# Patient Record
Sex: OTHER/UNKNOWN | Race: WHITE | HISPANIC OR LATINO | Employment: PART TIME | URBAN - METROPOLITAN AREA
[De-identification: names, ages, dates, MRNs, and addresses within clinical notes are randomized per-mention and may not be internally consistent; named-entity substitution may affect disease eponyms.]

---

## 2023-01-03 ENCOUNTER — TRANSFERRED RECORDS (OUTPATIENT)
Dept: HEALTH INFORMATION MANAGEMENT | Facility: CLINIC | Age: 18
End: 2023-01-03

## 2023-01-04 ENCOUNTER — HOSPITAL ENCOUNTER (OUTPATIENT)
Facility: CLINIC | Age: 18
Setting detail: OBSERVATION
LOS: 1 days | Discharge: HOME | End: 2023-01-07
Attending: EMERGENCY MEDICINE | Admitting: INTERNAL MEDICINE
Payer: COMMERCIAL

## 2023-01-04 ENCOUNTER — ANCILLARY PROCEDURE (OUTPATIENT)
Dept: ULTRASOUND IMAGING | Facility: CLINIC | Age: 18
End: 2023-01-04
Attending: NURSE PRACTITIONER
Payer: COMMERCIAL

## 2023-01-04 DIAGNOSIS — R10.84 GENERALIZED ABDOMINAL PAIN: ICD-10-CM

## 2023-01-04 DIAGNOSIS — Z90.49 STATUS POST LAPAROSCOPIC CHOLECYSTECTOMY: Primary | ICD-10-CM

## 2023-01-04 DIAGNOSIS — K80.50 CHOLEDOCHOLITHIASIS: ICD-10-CM

## 2023-01-04 DIAGNOSIS — Z20.822 LAB TEST NEGATIVE FOR COVID-19 VIRUS: ICD-10-CM

## 2023-01-04 LAB
ALBUMIN SERPL BCG-MCNC: 4.7 G/DL (ref 3.5–5.2)
ALBUMIN UR-MCNC: 50 MG/DL
ALP SERPL-CCNC: 121 U/L (ref 40–129)
ALT SERPL W P-5'-P-CCNC: 199 U/L (ref 10–50)
ANION GAP SERPL CALCULATED.3IONS-SCNC: 13 MMOL/L (ref 7–15)
APPEARANCE UR: CLEAR
AST SERPL W P-5'-P-CCNC: 66 U/L (ref 10–50)
BASOPHILS # BLD AUTO: 0 10E3/UL (ref 0–0.2)
BASOPHILS NFR BLD AUTO: 1 %
BILIRUB SERPL-MCNC: 0.8 MG/DL
BILIRUB UR QL STRIP: NEGATIVE
BUN SERPL-MCNC: 10.6 MG/DL (ref 6–20)
CALCIUM SERPL-MCNC: 9.3 MG/DL (ref 8.6–10)
CHLORIDE SERPL-SCNC: 102 MMOL/L (ref 98–107)
COLOR UR AUTO: YELLOW
CREAT SERPL-MCNC: 0.72 MG/DL (ref 0.67–1.17)
DEPRECATED HCO3 PLAS-SCNC: 24 MMOL/L (ref 22–29)
EOSINOPHIL # BLD AUTO: 0.1 10E3/UL (ref 0–0.7)
EOSINOPHIL NFR BLD AUTO: 1 %
ERYTHROCYTE [DISTWIDTH] IN BLOOD BY AUTOMATED COUNT: 11.9 % (ref 10–15)
GFR SERPL CREATININE-BSD FRML MDRD: >90 ML/MIN/1.73M2
GLUCOSE SERPL-MCNC: 97 MG/DL (ref 70–99)
GLUCOSE UR STRIP-MCNC: NEGATIVE MG/DL
HCT VFR BLD AUTO: 43.1 % (ref 40–53)
HGB BLD-MCNC: 14.2 G/DL (ref 13.3–17.7)
HGB UR QL STRIP: ABNORMAL
IMM GRANULOCYTES # BLD: 0 10E3/UL
IMM GRANULOCYTES NFR BLD: 0 %
KETONES UR STRIP-MCNC: NEGATIVE MG/DL
LEUKOCYTE ESTERASE UR QL STRIP: NEGATIVE
LIPASE SERPL-CCNC: 30 U/L (ref 13–60)
LYMPHOCYTES # BLD AUTO: 2.3 10E3/UL (ref 0.8–5.3)
LYMPHOCYTES NFR BLD AUTO: 29 %
MCH RBC QN AUTO: 30.2 PG (ref 26.5–33)
MCHC RBC AUTO-ENTMCNC: 32.9 G/DL (ref 31.5–36.5)
MCV RBC AUTO: 92 FL (ref 78–100)
MONOCYTES # BLD AUTO: 0.5 10E3/UL (ref 0–1.3)
MONOCYTES NFR BLD AUTO: 6 %
MUCOUS THREADS #/AREA URNS LPF: PRESENT /LPF
NEUTROPHILS # BLD AUTO: 5.2 10E3/UL (ref 1.6–8.3)
NEUTROPHILS NFR BLD AUTO: 63 %
NITRATE UR QL: NEGATIVE
NRBC # BLD AUTO: 0 10E3/UL
NRBC BLD AUTO-RTO: 0 /100
PH UR STRIP: 5.5 [PH] (ref 5–7)
PLATELET # BLD AUTO: 327 10E3/UL (ref 150–450)
POTASSIUM SERPL-SCNC: 3.3 MMOL/L (ref 3.4–5.3)
PROT SERPL-MCNC: 7.7 G/DL (ref 6.4–8.3)
RADIOLOGIST FLAGS: ABNORMAL
RBC # BLD AUTO: 4.7 10E6/UL (ref 4.4–5.9)
RBC URINE: 54 /HPF
SARS-COV-2 RNA RESP QL NAA+PROBE: NEGATIVE
SODIUM SERPL-SCNC: 139 MMOL/L (ref 136–145)
SP GR UR STRIP: 1.03 (ref 1–1.03)
SQUAMOUS EPITHELIAL: 2 /HPF
TRANSITIONAL EPI: <1 /HPF
UROBILINOGEN UR STRIP-MCNC: 2 MG/DL
WBC # BLD AUTO: 8.1 10E3/UL (ref 4–11)
WBC URINE: 1 /HPF

## 2023-01-04 PROCEDURE — 99285 EMERGENCY DEPT VISIT HI MDM: CPT | Performed by: EMERGENCY MEDICINE

## 2023-01-04 PROCEDURE — 85014 HEMATOCRIT: CPT | Performed by: EMERGENCY MEDICINE

## 2023-01-04 PROCEDURE — 83690 ASSAY OF LIPASE: CPT | Performed by: EMERGENCY MEDICINE

## 2023-01-04 PROCEDURE — 76700 US EXAM ABDOM COMPLETE: CPT | Mod: GC | Performed by: STUDENT IN AN ORGANIZED HEALTH CARE EDUCATION/TRAINING PROGRAM

## 2023-01-04 PROCEDURE — 36415 COLL VENOUS BLD VENIPUNCTURE: CPT | Performed by: EMERGENCY MEDICINE

## 2023-01-04 PROCEDURE — 99223 1ST HOSP IP/OBS HIGH 75: CPT | Mod: AI | Performed by: STUDENT IN AN ORGANIZED HEALTH CARE EDUCATION/TRAINING PROGRAM

## 2023-01-04 PROCEDURE — 120N000002 HC R&B MED SURG/OB UMMC

## 2023-01-04 PROCEDURE — U0005 INFEC AGEN DETEC AMPLI PROBE: HCPCS | Performed by: EMERGENCY MEDICINE

## 2023-01-04 PROCEDURE — 250N000013 HC RX MED GY IP 250 OP 250 PS 637

## 2023-01-04 PROCEDURE — 84155 ASSAY OF PROTEIN SERUM: CPT | Performed by: EMERGENCY MEDICINE

## 2023-01-04 PROCEDURE — C9803 HOPD COVID-19 SPEC COLLECT: HCPCS | Performed by: EMERGENCY MEDICINE

## 2023-01-04 PROCEDURE — 99285 EMERGENCY DEPT VISIT HI MDM: CPT | Mod: 25 | Performed by: EMERGENCY MEDICINE

## 2023-01-04 PROCEDURE — 84450 TRANSFERASE (AST) (SGOT): CPT | Performed by: EMERGENCY MEDICINE

## 2023-01-04 PROCEDURE — 81001 URINALYSIS AUTO W/SCOPE: CPT | Performed by: EMERGENCY MEDICINE

## 2023-01-04 RX ORDER — LIDOCAINE 40 MG/G
CREAM TOPICAL
Status: DISCONTINUED | OUTPATIENT
Start: 2023-01-04 | End: 2023-01-07 | Stop reason: HOSPADM

## 2023-01-04 RX ORDER — POTASSIUM CHLORIDE 750 MG/1
40 TABLET, EXTENDED RELEASE ORAL ONCE
Status: COMPLETED | OUTPATIENT
Start: 2023-01-04 | End: 2023-01-04

## 2023-01-04 RX ADMIN — POTASSIUM CHLORIDE 40 MEQ: 750 TABLET, EXTENDED RELEASE ORAL at 19:33

## 2023-01-04 ASSESSMENT — ACTIVITIES OF DAILY LIVING (ADL)
ADLS_ACUITY_SCORE: 35
ADLS_ACUITY_SCORE: 33
ADLS_ACUITY_SCORE: 35
ADLS_ACUITY_SCORE: 35

## 2023-01-04 NOTE — LETTER
[facility] [address]  [address] [facility] [address]  [phone]          January 7, 2023    RE:  Duyen Jones                                                                                                                                                       [address]  [facility] [address]            To whom it may concern:    Duyen Joens was under my professional care during a hospital stay from 1/4-1/7/23.    She  may return to work with the following: The employee is UNABLE to return to work until 1/10/23.    When the patient returns to work, the following restrictions apply until 2/3/23:  No lifting greater than 10-20 pounds. NO rigorous physical activity.      Sincerely,        Aggie Henao MD

## 2023-01-04 NOTE — ED PROVIDER NOTES
[place] EMERGENCY DEPARTMENT ([facility])    1/04/23       ED PROVIDER NOTE   Vertical Triage A 3:22 PM    History     Chief Complaint   Patient presents with     Abdominal Pain     The history is provided by the patient and medical records.     Duyen Jones is a 17 year old transfemale (assigned male at birth, uses she/her pronouns) on hormone replacement therapy who presents with abdominal pain, darker urine and abnormal imaging studies. Symptoms started a few days before New Year's Tonia while at work. She developed some abdominal pain after eating a questionable burrito, thought maybe she had some food poisoning.  This abdominal pain went away without issue but then few days later she developed recurrence of abdominal pain.  This time her abdominal pain was severe to the point where patient had to call in sick for work. Patient also noticed brownish to reddish urine despite hydrating copiously. She went to [facility], had labwork done showing abnormal LFTs.  An attempt was made to obtain these results, unfortunately [facility] is closed today.  She had an abdominal x-ray showing copious amounts of stool and gas.  Due to the abnormal bilirubin, patient had an abdominal ultrasound today concerning for choledocholithiasis. Patient was notified of these results and told to come to the Emergency Department for evaluation.  Currently patient's abdominal pain is a 2/10 in left lower quadrant; no right sided abdominal pain.  Patient endorses increased thirst and darker urine despite hydrating copiously.  Patient has been eating and drinking ok, last ate at 1pm today.  Patient states that she is scheduled for work at 6am tomorrow, asks if she'll need to call in, and if so asks for work note. No fever or chills. Takes medications for HRT, is about to start escitalopram.    EXAMINATION: US ABDOMEN COMPLETE,  1/4/2023 1:38 PM    Gallbladder: Mobile, echogenic stones within  "the gallbladder.  Gallbladder wall thickness is within normal limits measuring 2.7 mm.  No pericholecystic fluid. Negative sonographic Ruelas's sign.     Bile Ducts: Mild intrahepatic biliary ductal dilation.  The common  bile duct measures 8 mm in diameter. There is an echogenic stone  identified in the common bile duct measuring 7.5 x 3 mm (last cine  series, image 103).                                                                    IMPRESSION: Common bile duct is dilated with mild intrahepatic biliary  ductal dilatation, possible calculus in the proximal common bile duct;  cholelithiasis without additional sonographic findings for acute  Cholecystitis.    Past Medical History  No past medical history on file.  No past surgical history on file.  No current outpatient medications on file.    No Known Allergies  Family History  No family history on file.  Social History          A medically appropriate review of systems was performed with pertinent positives and negatives noted in the HPI, and all other systems negative.    Physical Exam   BP: (!) 149/78  Pulse: 109  Temp: 98.6  F (37  C)  Resp: 16  Height: 177.8 cm (5' 10\")  Weight: 79.4 kg (175 lb)  SpO2: 99 %  Physical Exam  Vitals and nursing note reviewed.   Constitutional:       General: He is not in acute distress.     Appearance: Normal appearance. He is not ill-appearing or toxic-appearing.   HENT:      Head: Normocephalic and atraumatic.      Nose: Nose normal.      Mouth/Throat:      Mouth: Mucous membranes are moist.   Eyes:      Pupils: Pupils are equal, round, and reactive to light.   Cardiovascular:      Rate and Rhythm: Normal rate.      Pulses: Normal pulses.      Heart sounds: Normal heart sounds.   Pulmonary:      Effort: Pulmonary effort is normal. No respiratory distress.      Breath sounds: Normal breath sounds.   Abdominal:      General: Abdomen is flat. There is no distension.      Comments: Mild tenderness palpation in the midepigastric " area and bilateral upper quadrants.  No guarding.  Ruelas sign negative.  Abdomen is soft.   Musculoskeletal:         General: No swelling or deformity. Normal range of motion.      Cervical back: Normal range of motion. No rigidity.   Skin:     General: Skin is warm.      Capillary Refill: Capillary refill takes less than 2 seconds.   Neurological:      Mental Status: He is alert and oriented to person, place, and time.   Psychiatric:         Mood and Affect: Mood normal.           ED Course, Procedures, & Data     ED Course as of 01/04/23 1526   Wed Jan 04, 2023   1525 Patient assessed in Vertical Triage A at 3:18 PM                   Results for orders placed or performed in visit on 01/04/23   US Abdomen Complete     Status: Abnormal   Result Value Ref Range    Radiologist flags Concern for choledocholithiasis (Urgent)     Narrative    EXAMINATION: US ABDOMEN COMPLETE,  1/4/2023 1:38 PM     COMPARISON: None.    HISTORY: Generalized abdominal pain and elevated LFTs. Rule out  cholecystitis/cholelithiasis.     TECHNIQUE: The abdomen was scanned in standard fashion with  specialized ultrasound transducer(s) using both gray-scale and limited  color Doppler techniques.    FINDINGS:  Liver: The liver demonstrates normal homogeneous echotexture. No  evidence of a focal hepatic mass. The main portal vein is patent with  antegrade flow.    Gallbladder: Mobile, echogenic stones within the gallbladder.  Gallbladder wall thickness is within normal limits measuring 2.7 mm.  No pericholecystic fluid. Negative sonographic Ruelas's sign.    Bile Ducts: Mild intrahepatic biliary ductal dilation.  The common  bile duct measures 8 mm in diameter. There is an echogenic stone  identified in the common bile duct measuring 7.5 x 3 mm (last cine  series, image 103).    Pancreas: Visualized portions of the head and body of the pancreas are  unremarkable.     Kidneys: Both kidneys are of normal echotexture, without mass  or  hydronephrosis.   The craniocaudal dimensions are: right- 10.8 cm,  left- 10.4 cm.    Spleen: The spleen is normal in size,  measuring 10.5 cm in sagittal  dimension.    Aorta and IVC: The visualized portions of the aorta and IVC are  unremarkable. The proximal aorta measures 1.5 cm in diameter and the  IVC measures 1.5 cm in diameter.    Fluid: No evidence of ascites or pleural effusions.      Impression    IMPRESSION: Common bile duct is dilated with mild intrahepatic biliary  ductal dilatation, possible calculus in the proximal common bile duct;  cholelithiasis without additional sonographic findings for acute  cholecystitis.    [Access Center: Concern for choledocholithiasis]    This report will be copied to the [facility] to ensure a  provider acknowledges the finding. Access Center is available Monday  through Friday 8am-3:30 pm.         I have personally reviewed the examination and initial interpretation  and I agree with the findings.    ATIYA CLEMENS MD         SYSTEM ID:  [id number]     Medications - No data to display  Labs Ordered and Resulted from Time of ED Arrival to Time of ED Departure - No data to display  No orders to display          Medical Decision Making  The patient presented with a problem that is an acute health issue posing potential threat to life or bodily function.    The patient's evaluation involved:  ordering and review of 1 test(s) (see separate area of note for details)  review of 3+ test result(s) ordered prior to this encounter (see separate area of note for details)  strong consideration of a test (CT) that was ultimately deferred  discussion of management or test interpretation with another health professional (see separate area of note for details)    The patient's management involved a decision regarding emergency major surgery and a decision regarding hospitalization.      Assessment & Plan    Patient presents to the ED at the advisement of outpatient care  team with concern for choledocholithiasis.  Reviewed ultrasound from outpatient setting which is notable for common bile duct dilatation of 8 mm with stone.  Outpatient labs are unable to be reviewed but reportedly showed hyperbilirubinemia and urobilinogen in the urine.  Ultrasound does not suggest cholecystitis.    On arrival, patient slightly tachycardic, otherwise appears well.  No signs of peritonitis on exam.  No jaundice.  No altered mental status.  Low suspicion for cholangitis.  Labs notable for transaminitis.  Bilirubin is surprisingly within normal limits.  Low clinical suspicion for cholecystitis/cholangitis.  I discussed ultrasound findings with the GI team.  No indication for further clarification with CT scan.  Recommend admission to medicine, n.p.o. at midnight, likely ERCP tomorrow.  Case discussed with the medicine team.      I have reviewed the nursing notes. I have reviewed the findings, diagnosis, plan and need for follow up with the patient.    New Prescriptions    No medications on file       Final diagnoses:   None     I, Rocio Quintero, am serving as a trained medical scribe to document services personally performed by Jose Luis Bacon DO based on the provider's statements to me on January 4, 2023.  This document has been checked and approved by the attending provider.    IJose Luis DO, was physically present and have reviewed and verified the accuracy of this note documented by Rocio Quintero, medical scribe.      Jose Luis Bacon DO   [facility] EMERGENCY DEPARTMENT  1/4/2023     Jose Luis Bacon DO  01/04/23 1919

## 2023-01-04 NOTE — ED TRIAGE NOTES
"Triage Assessment & Note:    BP (!) 149/78   Pulse 109   Temp 98.6  F (37  C) (Temporal)   Resp 16   Ht 1.778 m (5' 10\")   Wt 79.4 kg (175 lb)   SpO2 99%   BMI 25.11 kg/m      Patient presents with: PT Dx with a gull stone and told to come to the ER for treatment of the stone.     Home Treatments/Remedies: None    Febrile / Afebrile? Afebrile     Duration of C/o: unknown      Elio Canchola RN  January 4, 2023         Triage Assessment     Row Name 01/04/23 1450       Triage Assessment (Adult)    Airway WDL WDL       Respiratory WDL    Respiratory WDL WDL       Cardiac WDL    Cardiac WDL WDL              "

## 2023-01-04 NOTE — LETTER
[facility] EMERGENCY DEPARTMENT  [address] [facility] [address]  [phone]      January 4, 2023    RE:  Duyen Jones                                                                                                                                                       [address] [facility] [address]            To whom it may concern:    Duyen Jones is being admitted to the hospital for acute illness. She will be absent from work starting 1/5/2023. Exact date of discharge from the hospital is currently [id number], but I anticipate that she will be able to return to work around Monday 1/9/2023.       Sincerely,          Warren Wan MD    [facility]

## 2023-01-05 ENCOUNTER — ANESTHESIA EVENT (OUTPATIENT)
Dept: SURGERY | Facility: CLINIC | Age: 18
End: 2023-01-05
Payer: COMMERCIAL

## 2023-01-05 ENCOUNTER — APPOINTMENT (OUTPATIENT)
Dept: GENERAL RADIOLOGY | Facility: CLINIC | Age: 18
End: 2023-01-05
Attending: INTERNAL MEDICINE
Payer: COMMERCIAL

## 2023-01-05 ENCOUNTER — ANESTHESIA (OUTPATIENT)
Dept: SURGERY | Facility: CLINIC | Age: 18
End: 2023-01-05
Payer: COMMERCIAL

## 2023-01-05 LAB
ALBUMIN SERPL BCG-MCNC: 4.1 G/DL (ref 3.5–5.2)
ALP SERPL-CCNC: 103 U/L (ref 35–129)
ALT SERPL W P-5'-P-CCNC: 169 U/L (ref 10–50)
ANION GAP SERPL CALCULATED.3IONS-SCNC: 12 MMOL/L (ref 7–15)
AST SERPL W P-5'-P-CCNC: 74 U/L (ref 10–50)
BILIRUB SERPL-MCNC: 0.5 MG/DL
BUN SERPL-MCNC: 9.2 MG/DL (ref 6–20)
CALCIUM SERPL-MCNC: 9.3 MG/DL (ref 8.6–10)
CHLORIDE SERPL-SCNC: 106 MMOL/L (ref 98–107)
CREAT SERPL-MCNC: 0.73 MG/DL (ref 0.51–1.17)
DEPRECATED HCO3 PLAS-SCNC: 21 MMOL/L (ref 22–29)
ERCP: NORMAL
ERYTHROCYTE [DISTWIDTH] IN BLOOD BY AUTOMATED COUNT: 11.9 % (ref 10–15)
GFR SERPL CREATININE-BSD FRML MDRD: >90 ML/MIN/1.73M2
GLUCOSE BLDC GLUCOMTR-MCNC: 100 MG/DL (ref 70–99)
GLUCOSE SERPL-MCNC: 106 MG/DL (ref 70–99)
HCT VFR BLD AUTO: 42.7 % (ref 35–53)
HGB BLD-MCNC: 13.8 G/DL (ref 11.7–17.7)
INR PPP: 0.92 (ref 0.85–1.15)
MAGNESIUM SERPL-MCNC: 2.1 MG/DL (ref 1.7–2.3)
MCH RBC QN AUTO: 29.5 PG (ref 26.5–33)
MCHC RBC AUTO-ENTMCNC: 32.3 G/DL (ref 31.5–36.5)
MCV RBC AUTO: 91 FL (ref 78–100)
PLATELET # BLD AUTO: 306 10E3/UL (ref 150–450)
POTASSIUM SERPL-SCNC: 4.8 MMOL/L (ref 3.4–5.3)
PROT SERPL-MCNC: 7.1 G/DL (ref 6.4–8.3)
RBC # BLD AUTO: 4.68 10E6/UL (ref 3.8–5.9)
SODIUM SERPL-SCNC: 139 MMOL/L (ref 136–145)
WBC # BLD AUTO: 7.7 10E3/UL (ref 4–11)

## 2023-01-05 PROCEDURE — C1726 CATH, BAL DIL, NON-VASCULAR: HCPCS | Performed by: INTERNAL MEDICINE

## 2023-01-05 PROCEDURE — 258N000003 HC RX IP 258 OP 636: Performed by: NURSE ANESTHETIST, CERTIFIED REGISTERED

## 2023-01-05 PROCEDURE — C1769 GUIDE WIRE: HCPCS | Performed by: INTERNAL MEDICINE

## 2023-01-05 PROCEDURE — 82962 GLUCOSE BLOOD TEST: CPT

## 2023-01-05 PROCEDURE — 36415 COLL VENOUS BLD VENIPUNCTURE: CPT

## 2023-01-05 PROCEDURE — 250N000009 HC RX 250: Performed by: NURSE ANESTHETIST, CERTIFIED REGISTERED

## 2023-01-05 PROCEDURE — 83735 ASSAY OF MAGNESIUM: CPT | Performed by: STUDENT IN AN ORGANIZED HEALTH CARE EDUCATION/TRAINING PROGRAM

## 2023-01-05 PROCEDURE — 250N000009 HC RX 250: Performed by: INTERNAL MEDICINE

## 2023-01-05 PROCEDURE — 999N000179 XR SURGERY CARM FLUORO LESS THAN 5 MIN W STILLS: Mod: TC

## 2023-01-05 PROCEDURE — 99222 1ST HOSP IP/OBS MODERATE 55: CPT | Mod: 25 | Performed by: INTERNAL MEDICINE

## 2023-01-05 PROCEDURE — C1877 STENT, NON-COAT/COV W/O DEL: HCPCS | Performed by: INTERNAL MEDICINE

## 2023-01-05 PROCEDURE — 710N000010 HC RECOVERY PHASE 1, LEVEL 2, PER MIN: Performed by: INTERNAL MEDICINE

## 2023-01-05 PROCEDURE — 99232 SBSQ HOSP IP/OBS MODERATE 35: CPT | Mod: GC | Performed by: INTERNAL MEDICINE

## 2023-01-05 PROCEDURE — 85027 COMPLETE CBC AUTOMATED: CPT

## 2023-01-05 PROCEDURE — 85610 PROTHROMBIN TIME: CPT | Performed by: INTERNAL MEDICINE

## 2023-01-05 PROCEDURE — 272N000001 HC OR GENERAL SUPPLY STERILE: Performed by: INTERNAL MEDICINE

## 2023-01-05 PROCEDURE — 255N000002 HC RX 255 OP 636: Performed by: INTERNAL MEDICINE

## 2023-01-05 PROCEDURE — 370N000017 HC ANESTHESIA TECHNICAL FEE, PER MIN: Performed by: INTERNAL MEDICINE

## 2023-01-05 PROCEDURE — 80053 COMPREHEN METABOLIC PANEL: CPT

## 2023-01-05 PROCEDURE — 99222 1ST HOSP IP/OBS MODERATE 55: CPT | Mod: GC | Performed by: SURGERY

## 2023-01-05 PROCEDURE — 36415 COLL VENOUS BLD VENIPUNCTURE: CPT | Performed by: INTERNAL MEDICINE

## 2023-01-05 PROCEDURE — G0378 HOSPITAL OBSERVATION PER HR: HCPCS

## 2023-01-05 PROCEDURE — 250N000011 HC RX IP 250 OP 636: Performed by: NURSE ANESTHETIST, CERTIFIED REGISTERED

## 2023-01-05 PROCEDURE — 360N000083 HC SURGERY LEVEL 3 W/ FLUORO, PER MIN: Performed by: INTERNAL MEDICINE

## 2023-01-05 PROCEDURE — 999N000141 HC STATISTIC PRE-PROCEDURE NURSING ASSESSMENT: Performed by: INTERNAL MEDICINE

## 2023-01-05 PROCEDURE — 250N000025 HC SEVOFLURANE, PER MIN: Performed by: INTERNAL MEDICINE

## 2023-01-05 DEVICE — STENT FREEMAN PANCREA FLEX 5FRX7CM SGL PIGTAIL: Type: IMPLANTABLE DEVICE | Site: BILE DUCT | Status: FUNCTIONAL

## 2023-01-05 RX ORDER — LANOLIN ALCOHOL/MO/W.PET/CERES
3 CREAM (GRAM) TOPICAL
Status: DISCONTINUED | OUTPATIENT
Start: 2023-01-05 | End: 2023-01-07 | Stop reason: HOSPADM

## 2023-01-05 RX ORDER — FENTANYL CITRATE 50 UG/ML
50 INJECTION, SOLUTION INTRAMUSCULAR; INTRAVENOUS EVERY 5 MIN PRN
Status: DISCONTINUED | OUTPATIENT
Start: 2023-01-05 | End: 2023-01-05 | Stop reason: HOSPADM

## 2023-01-05 RX ORDER — IOPAMIDOL 510 MG/ML
INJECTION, SOLUTION INTRAVASCULAR PRN
Status: DISCONTINUED | OUTPATIENT
Start: 2023-01-05 | End: 2023-01-05 | Stop reason: HOSPADM

## 2023-01-05 RX ORDER — DEXAMETHASONE SODIUM PHOSPHATE 4 MG/ML
INJECTION, SOLUTION INTRA-ARTICULAR; INTRALESIONAL; INTRAMUSCULAR; INTRAVENOUS; SOFT TISSUE PRN
Status: DISCONTINUED | OUTPATIENT
Start: 2023-01-05 | End: 2023-01-05

## 2023-01-05 RX ORDER — PROPOFOL 10 MG/ML
INJECTION, EMULSION INTRAVENOUS PRN
Status: DISCONTINUED | OUTPATIENT
Start: 2023-01-05 | End: 2023-01-05

## 2023-01-05 RX ORDER — ONDANSETRON 4 MG/1
4 TABLET, ORALLY DISINTEGRATING ORAL EVERY 6 HOURS PRN
Status: DISCONTINUED | OUTPATIENT
Start: 2023-01-05 | End: 2023-01-07 | Stop reason: HOSPADM

## 2023-01-05 RX ORDER — FENTANYL CITRATE 50 UG/ML
25 INJECTION, SOLUTION INTRAMUSCULAR; INTRAVENOUS EVERY 5 MIN PRN
Status: DISCONTINUED | OUTPATIENT
Start: 2023-01-05 | End: 2023-01-05 | Stop reason: HOSPADM

## 2023-01-05 RX ORDER — HYDROMORPHONE HCL IN WATER/PF 6 MG/30 ML
0.4 PATIENT CONTROLLED ANALGESIA SYRINGE INTRAVENOUS EVERY 5 MIN PRN
Status: DISCONTINUED | OUTPATIENT
Start: 2023-01-05 | End: 2023-01-05 | Stop reason: HOSPADM

## 2023-01-05 RX ORDER — SPIRONOLACTONE 100 MG/1
125 TABLET, FILM COATED ORAL DAILY
COMMUNITY

## 2023-01-05 RX ORDER — CITALOPRAM HYDROBROMIDE 10 MG/1
5 TABLET ORAL DAILY
Status: ON HOLD | COMMUNITY
End: 2023-01-06 | Stop reason: ALTCHOICE

## 2023-01-05 RX ORDER — ACETAMINOPHEN 325 MG/1
650 TABLET ORAL EVERY 4 HOURS PRN
Status: DISCONTINUED | OUTPATIENT
Start: 2023-01-05 | End: 2023-01-07 | Stop reason: HOSPADM

## 2023-01-05 RX ORDER — HYDRALAZINE HYDROCHLORIDE 20 MG/ML
2.5-5 INJECTION INTRAMUSCULAR; INTRAVENOUS EVERY 10 MIN PRN
Status: DISCONTINUED | OUTPATIENT
Start: 2023-01-05 | End: 2023-01-05 | Stop reason: HOSPADM

## 2023-01-05 RX ORDER — INDOMETHACIN 50 MG/1
SUPPOSITORY RECTAL PRN
Status: DISCONTINUED | OUTPATIENT
Start: 2023-01-05 | End: 2023-01-05 | Stop reason: HOSPADM

## 2023-01-05 RX ORDER — HYDROMORPHONE HCL IN WATER/PF 6 MG/30 ML
0.2 PATIENT CONTROLLED ANALGESIA SYRINGE INTRAVENOUS EVERY 5 MIN PRN
Status: DISCONTINUED | OUTPATIENT
Start: 2023-01-05 | End: 2023-01-05 | Stop reason: HOSPADM

## 2023-01-05 RX ORDER — FENTANYL CITRATE 50 UG/ML
INJECTION, SOLUTION INTRAMUSCULAR; INTRAVENOUS PRN
Status: DISCONTINUED | OUTPATIENT
Start: 2023-01-05 | End: 2023-01-05

## 2023-01-05 RX ORDER — ONDANSETRON 2 MG/ML
4 INJECTION INTRAMUSCULAR; INTRAVENOUS EVERY 6 HOURS PRN
Status: DISCONTINUED | OUTPATIENT
Start: 2023-01-05 | End: 2023-01-07 | Stop reason: HOSPADM

## 2023-01-05 RX ORDER — ONDANSETRON 2 MG/ML
INJECTION INTRAMUSCULAR; INTRAVENOUS PRN
Status: DISCONTINUED | OUTPATIENT
Start: 2023-01-05 | End: 2023-01-05

## 2023-01-05 RX ORDER — SODIUM CHLORIDE, SODIUM LACTATE, POTASSIUM CHLORIDE, CALCIUM CHLORIDE 600; 310; 30; 20 MG/100ML; MG/100ML; MG/100ML; MG/100ML
INJECTION, SOLUTION INTRAVENOUS CONTINUOUS PRN
Status: DISCONTINUED | OUTPATIENT
Start: 2023-01-05 | End: 2023-01-05

## 2023-01-05 RX ADMIN — Medication 50 MG: at 13:12

## 2023-01-05 RX ADMIN — DEXAMETHASONE SODIUM PHOSPHATE 4 MG: 4 INJECTION, SOLUTION INTRA-ARTICULAR; INTRALESIONAL; INTRAMUSCULAR; INTRAVENOUS; SOFT TISSUE at 13:24

## 2023-01-05 RX ADMIN — SUGAMMADEX 200 MG: 100 INJECTION, SOLUTION INTRAVENOUS at 14:00

## 2023-01-05 RX ADMIN — PROPOFOL 200 MG: 10 INJECTION, EMULSION INTRAVENOUS at 13:12

## 2023-01-05 RX ADMIN — SODIUM CHLORIDE, POTASSIUM CHLORIDE, SODIUM LACTATE AND CALCIUM CHLORIDE: 600; 310; 30; 20 INJECTION, SOLUTION INTRAVENOUS at 13:00

## 2023-01-05 RX ADMIN — FENTANYL CITRATE 100 MCG: 50 INJECTION, SOLUTION INTRAMUSCULAR; INTRAVENOUS at 13:12

## 2023-01-05 RX ADMIN — MIDAZOLAM 2 MG: 1 INJECTION INTRAMUSCULAR; INTRAVENOUS at 13:01

## 2023-01-05 RX ADMIN — ONDANSETRON 4 MG: 2 INJECTION INTRAMUSCULAR; INTRAVENOUS at 13:24

## 2023-01-05 ASSESSMENT — ACTIVITIES OF DAILY LIVING (ADL)
ADLS_ACUITY_SCORE: 35
ADLS_ACUITY_SCORE: 18
ADLS_ACUITY_SCORE: 35
ADLS_ACUITY_SCORE: 18
ADLS_ACUITY_SCORE: 35
ADLS_ACUITY_SCORE: 18
ADLS_ACUITY_SCORE: 35
ADLS_ACUITY_SCORE: 18
ADLS_ACUITY_SCORE: 35
ADLS_ACUITY_SCORE: 35

## 2023-01-05 ASSESSMENT — LIFESTYLE VARIABLES: TOBACCO_USE: 1

## 2023-01-05 NOTE — PROGRESS NOTES
[facility]    Progress Note - Medicine Service, MARO[facility] TEAM 4       Date of Admission:  1/4/2023    Assessment & Plan   Duyen Be Jones is a 17 year-old transgender female with PMH of prior laparoscopic appendectomy 2018 who presented with abdominal pain. Abdominal US findings concerning for choledocholithiasis. ERCP today and getting laparoscopic cholecystectomy on 1/6.      # Abdominal pain  # Choledocholithiasis  Patient presenting with intermittent abdominal pain for at least 1 week. Noted to have a severe episode on Monday 1/2 that lasted the whole day, causing her to have to miss work. No significant changes with meals or bowel movements, but certain positions worsen the pain, such as laying down. Otherwise no nausea, vomiting, or diarrhea. Labs on admission notable for  and AST 66. US abd findings showing CBD dilation to 8mm and visualized stone in CBD. No evidence of acute cholecystitis on US. Given presence of visible stone in CBD on ultrasound, patient is at high risk of choledocholithiasis and would benefit from ERCP. Transaminase elevations could also be present in early choledocholithiasis. No concerning signs for acute cholangitis, and lipase is normal. GI panc/bili has been consulted, possible ERCP 1/5.   - GI panc bili consulted, appreciate recs  - ERCP completed with Dr. Almanza on 1/5   > nondilated bile duct, patent CD, gallbladder with stones   > no obvious stone. Limited biliary sphincterotomy and swept duct clear, biliary stent placed  - general surgery consulted for cholecystectomy this admission  - CLD  - NPO at [facility] for lap denise with gen surgery on 1/6  - pain: tylenol PRN  - nausea: zofran PRN    # Hypokalemia  K low at 3.3 on arrival.   - replete PRN     # Abnormal urinalysis  Patient reporting darker urine at home, but no dysuria, decreased urine output, or increased urinary frequency. UA on admission showing hematuria  with 54 RBC. Hgb normal. Unclear etiology at this time. Would likely benefit with outpatient follow up for this issues.         Diet: Clear Liquid Diet  NPO per Anesthesia Guidelines for Procedure/Surgery Except for: Meds    DVT Prophylaxis:  Ambulating  Acosta Catheter: Not present  Fluids: None  Lines: None     Cardiac Monitoring: ACTIVE order. Indication: Procedural area  Code Status: Full Code      Clinically Significant Risk Factors Present on Admission        # Hypokalemia: Lowest K = 3.3 mmol/L in last 2 days, will replace as needed                        Disposition Plan      Expected Discharge Date: 01/06/2023        Discharge Comments: likely home after ERCP, potential cholecystectomy this admission        The patient's care was discussed with Dr. Henao, bedside nurse, and patient.    Fatoumata Crook MD  Medicine Service, [facility] TEAM [address] [place] [address]  Securely message with [employer] (more info)  Text page via [facility] Paging/Directory   See signed in provider for up to date coverage information  ______________________________________________________________________    Interval History   NAEO. Has abdominal pain that comes and goes. No nausea or vomiting. No chest pain or dyspnea. Wonders what ERCP entails and what life would be like without a gallbladder.     Physical Exam   Vital Signs: Temp: 98.2  F (36.8  C) Temp src: Oral BP: 106/51 Pulse: 53   Resp: 13 SpO2: 95 % O2 Device: None (Room air)    Weight: 175 lbs 0 oz    General Appearance: Young appearing female resting comfortably in NAD  Respiratory: CTAB, no wheezes or crackles  Cardiovascular: RRR, no m/r/g  GI: soft, nondistended, appy scar, mildly tender to palpation of RUQ  Skin: no lesions on exposed skin  Ext: Warm, well perfused, no peripheral edema    Medical Decision Making       Please see A&P for additional details of medical decision making.      Data     I have personally reviewed the following data  over the past 24 hrs:    7.7  \   13.8   / 306     139 106 9.2 /  100 (H)   4.8 21 (L) 0.73 \       ALT: 169 (H) AST: 74 (H) AP: 103 TBILI: 0.5   ALB: 4.1 TOT PROTEIN: 7.1 LIPASE: N/A       INR:  0.92 PTT:  N/A   D-dimer:  N/A Fibrinogen:  N/A       Imaging results reviewed over the past 24 hrs:   Recent Results (from the past 24 hour(s))   XR Surgery JONNA Fluoro Less Than 5 Min w Stills    Narrative    This exam was marked as non-reportable because it will not be read by a   radiologist or a [place] non-radiologist provider.

## 2023-01-05 NOTE — UTILIZATION REVIEW
"    Admission Status; Secondary Review Determination         Under the authority of the Utilization Management Committee, the utilization review process indicated a secondary review on the above patient.  The review outcome is based on review of the medical records, discussions with staff, and applying clinical experience noted on the date of the review.        ()      Inpatient Status Appropriate - This patient's medical care is consistent with medical management for inpatient care and reasonable inpatient medical practice.      (X) Observation Status Appropriate - This patient does not meet hospital inpatient criteria and is placed in observation status. If this patient's primary payer is Medicare and was admitted as an inpatient, Condition Code 44 should be used and patient status changed to \"observation\".   () Admission Status NOT Appropriate - This patient's medical care is not consistent with medical management for Inpatient or Observation Status.          RATIONALE FOR DETERMINATION     \"Duyen Jones is a 17 year old adult with a history of prior laparoscopic appendectomy 2018 who presented with abdominal pain.\"    Pt is HD stable, not on IV any meds, plan is for ERCP and subsequent lap cholecystectomy. Given discharge soon, observation status is appropriate.      The severity of illness, intensity of service provided, expected LOS make it appropriate for hospital observation.        The information on this document is developed by the utilization review team in order for the business office to ensure compliance.  This only denotes the appropriateness of proper admission status and does not reflect the quality of care rendered.         The definitions of Inpatient Status and Observation Status used in making the determination above are those provided in the CMS Coverage Manual, Chapter 1 and Chapter 6, section 70.4.      Sincerely,     FABIANA KAUR MD    Physician Advisor  Utilization Review/ " Case Management  [facility].

## 2023-01-05 NOTE — H&P
[facility]    History and Physical - Medicine Service, [facility] TEAM        Date of Admission:  1/4/2023    Assessment & Plan      Duyen Jones is a 17 year-old transgender woman with PMH of prior laparoscopic appendectomy 2018 who presented with abdominal pain. Abdominal US findings concerning for choledocholithiasis. Possible ERCP on 1/5.     # Abdominal pain  # Concern for choledocholithiasis  Patient presenting with intermittent abdominal pain for at least 1 week. Noted to have a severe episode on Monday 1/2 that lasted the whole day, causing her to have to miss work. No significant changes with meals or bowel movements, but certain positions worsen the pain, such as laying down. Otherwise no nausea, vomiting, or diarrhea. Labs on admission notable for  and AST 66. US abd findings showing CBD dilation to 8mm and visualized stone in CBD. No evidence of acute cholecystitis on US. Given presence of visible stone in CBD on ultrasound, patient is at high risk of choledocholithiasis and would benefit from ERCP. Transaminase elevations could also be present in early choledocholithiasis. No concerning signs for acute cholangitis, and lipase is normal. GI panc/bili has been consulted, possible ERCP 1/5.   - GI panc bili consulted, appreciate recs   > possible ERCP 1/5   > NPO @ midnight  - trend CMP    # Hypokalemia  K low at 3.3 on arrival.   - 40 mEq PO KCl ordered    # Abnormal urinalysis  Patient reporting darker urine at home, but no dysuria, decreased urine output, or increased urinary frequency. UA on admission showing hematuria with 54 RBC. Hgb normal. Unclear etiology at this time. Would likely benefit with outpatient follow up for this issues.        Diet: NPO for Medical/Clinical Reasons Except for: Meds  Regular Diet Adult  NPO per Anesthesia Guidelines for Procedure/Surgery Except for: Meds    DVT Prophylaxis: Pneumatic Compression  Devices  Acosta Catheter: Not present  Fluids: none  Lines: None     Cardiac Monitoring: None  Code Status: Full Code      Clinically Significant Risk Factors Present on Admission        # Hypokalemia: Lowest K = 3.3 mmol/L in last 2 days, will replace as needed                        Disposition Plan      Expected Discharge Date: 01/06/2023                The patient's care was discussed with the Attending Physician, Dr. Bruno.      Warren Wan MD   PGY1, Internal Medicine  Medicine Service, [facility]  Securely message with Vocera (more info)  Text page via [employer] Paging/Directory   See signed in provider for up to date coverage information  ______________________________________________________________________    Chief Complaint   Abdominal pain    History is obtained from the patient    History of Present Illness   Duyen Jones is a 17 year-old transgender woman with PMH of prior laparoscopic appendectomy 2018 who presented with abdominal pain.     Patient noticed intermittent abdominal pain about 1 week ago. Initially noticed after eating a possible bad burrito, thought she had food poisoning. Abd pain resolved but then returned on Monday 1/2 and was severe to the point that patient had to call in sick to work. She was seen at [facility], where she was told she had abnormal liver labs. She also had abdominal x-ray showing significant stool and gas as well as an abdominal ultrasound that was concerning for choledocholithiasis. She received a call from her clinic today due to the ultrasound findings and was told to come to the ED for evaluation. When evaluated in the ED, patient only had mild abdominal pain, present in LLQ. Also reported some dark urine at home but otherwise no dysuria or change in urine volume/frequency. Has good appetite and oral intake. No nausea, vomiting, or diarrhea. Only medication she takes at home is HRT. Previously on  escitalopram and planning to resume soon, but not currently on it at this time. Denies fevers/chills, SOB, chest pain.       Past Medical History    No past medical history on file.    Past Surgical History   No past surgical history on file.    Prior to Admission Medications   None        Review of Systems    The 10 point Review of Systems is negative other than noted in the HPI or here.      Physical Exam   Vital Signs: Temp: 98.6  F (37  C) Temp src: Temporal BP: (!) 149/78 Pulse: 109   Resp: 16 SpO2: 99 %      Weight: 175 lbs 0 oz    Physical Exam  Constitutional:       Appearance: Normal appearance.   HENT:      Head: Normocephalic and atraumatic.   Eyes:      Conjunctiva/sclera: Conjunctivae normal.   Cardiovascular:      Rate and Rhythm: Normal rate and regular rhythm.      Heart sounds: Normal heart sounds. No murmur heard.  Pulmonary:      Effort: Pulmonary effort is normal. No respiratory distress.      Breath sounds: Normal breath sounds.   Abdominal:      General: Bowel sounds are normal.      Palpations: Abdomen is soft.      Comments: Mild tenderness to palpation over LLQ.    Musculoskeletal:      Left lower leg: No edema.   Skin:     General: Skin is warm.   Neurological:      Mental Status: He is alert and oriented to person, place, and time.   Psychiatric:         Mood and Affect: Mood normal.           Medical Decision Making     Please see A&P for additional details of medical decision making.      Data     I have personally reviewed the following data over the past 24 hrs:    8.1  \   14.2   / 327     139 102 10.6 /  97   3.3 (L) 24 0.72 \       ALT: 199 (H) AST: 66 (H) AP: 121 TBILI: 0.8   ALB: 4.7 TOT PROTEIN: 7.7 LIPASE: 30       Imaging results reviewed over the past 24 hrs:   Recent Results (from the past 24 hour(s))   US Abdomen Complete   Result Value    Radiologist flags Concern for choledocholithiasis (Urgent)    Narrative    EXAMINATION: US ABDOMEN COMPLETE,  1/4/2023 1:38 PM      COMPARISON: None.    HISTORY: Generalized abdominal pain and elevated LFTs. Rule out  cholecystitis/cholelithiasis.     TECHNIQUE: The abdomen was scanned in standard fashion with  specialized ultrasound transducer(s) using both gray-scale and limited  color Doppler techniques.    FINDINGS:  Liver: The liver demonstrates normal homogeneous echotexture. No  evidence of a focal hepatic mass. The main portal vein is patent with  antegrade flow.    Gallbladder: Mobile, echogenic stones within the gallbladder.  Gallbladder wall thickness is within normal limits measuring 2.7 mm.  No pericholecystic fluid. Negative sonographic Ruelas's sign.    Bile Ducts: Mild intrahepatic biliary ductal dilation.  The common  bile duct measures 8 mm in diameter. There is an echogenic stone  identified in the common bile duct measuring 7.5 x 3 mm (last cine  series, image 103).    Pancreas: Visualized portions of the head and body of the pancreas are  unremarkable.     Kidneys: Both kidneys are of normal echotexture, without mass or  hydronephrosis.   The craniocaudal dimensions are: right- 10.8 cm,  left- 10.4 cm.    Spleen: The spleen is normal in size,  measuring 10.5 cm in sagittal  dimension.    Aorta and IVC: The visualized portions of the aorta and IVC are  unremarkable. The proximal aorta measures 1.5 cm in diameter and the  IVC measures 1.5 cm in diameter.    Fluid: No evidence of ascites or pleural effusions.      Impression    IMPRESSION: Common bile duct is dilated with mild intrahepatic biliary  ductal dilatation, possible calculus in the proximal common bile duct;  cholelithiasis without additional sonographic findings for acute  cholecystitis.    [Access Center: Concern for choledocholithiasis]    This report will be copied to the [facility] to ensure a  provider acknowledges the finding. Access Center is available Monday  through Friday 8am-3:30 pm.         I have personally reviewed the examination and  initial interpretation  and I agree with the findings.    ATIYA CLEMENS MD         SYSTEM ID:  [id number]

## 2023-01-05 NOTE — ANESTHESIA PREPROCEDURE EVALUATION
Anesthesia Pre-Procedure Evaluation    Patient: Duyen Jones   MRN: [id number] : 2005        Procedure : Procedure(s):  ENDOSCOPIC RETROGRADE CHOLANGIOPANCREATOGRAPHY          Past Medical History:   Diagnosis Date     Anxiety       Past Surgical History:   Procedure Laterality Date     LAPAROSCOPIC APPENDECTOMY  2019      No Known Allergies   Social History     Tobacco Use     Smoking status: Never     Smokeless tobacco: Never   Substance Use Topics     Alcohol use: Yes     Alcohol/week: 1.0 standard drink     Types: 1 Standard drinks or equivalent per week      Wt Readings from Last 1 Encounters:   23 79.4 kg (175 lb) (76 %, Z= 0.72)*     * Growth percentiles are based on CDC (Boys, 2-20 Years) data.        Anesthesia Evaluation   Pt has had prior anesthetic.     No history of anesthetic complications       ROS/MED HX  ENT/Pulmonary:  - neg pulmonary ROS   (+) tobacco use, Past use,     Neurologic:  - neg neurologic ROS     Cardiovascular:       METS/Exercise Tolerance:     Hematologic:       Musculoskeletal:       GI/Hepatic: Comment: S/p lap appy    choledocholithiasis      Renal/Genitourinary:       Endo:       Psychiatric/Substance Use:       Infectious Disease:       Malignancy:       Other:            Physical Exam    Airway        Mallampati: II   TM distance: > 3 FB   Neck ROM: full   Mouth opening: > 3 cm    Respiratory Devices and Support         Dental  no notable dental history         Cardiovascular          Rhythm and rate: regular and normal     Pulmonary           breath sounds clear to auscultation           OUTSIDE LABS:  CBC:   Lab Results   Component Value Date    WBC 7.7 2023    WBC 8.1 2023    HGB 13.8 2023    HGB 14.2 2023    HCT 42.7 2023    HCT 43.1 2023     2023     2023     BMP:   Lab Results   Component Value Date     2023     2023    POTASSIUM 4.8 2023    POTASSIUM  3.3 (L) 01/04/2023    CHLORIDE 106 01/05/2023    CHLORIDE 102 01/04/2023    CO2 21 (L) 01/05/2023    CO2 24 01/04/2023    BUN 9.2 01/05/2023    BUN 10.6 01/04/2023    CR 0.73 01/05/2023    CR 0.72 01/04/2023     (H) 01/05/2023     (H) 01/05/2023     COAGS:   Lab Results   Component Value Date    INR 0.92 01/05/2023     POC: No results found for: BGM, HCG, HCGS  HEPATIC:   Lab Results   Component Value Date    ALBUMIN 4.1 01/05/2023    PROTTOTAL 7.1 01/05/2023     (H) 01/05/2023    AST 74 (H) 01/05/2023    ALKPHOS 103 01/05/2023    BILITOTAL 0.5 01/05/2023     OTHER:   Lab Results   Component Value Date    ANDREW 9.3 01/05/2023    MAG 2.1 01/05/2023    LIPASE 30 01/04/2023       Anesthesia Plan    ASA Status:  1   NPO Status:  NPO Appropriate    Anesthesia Type: General.              Consents    Anesthesia Plan(s) and associated risks, benefits, and realistic alternatives discussed. Questions answered and patient/representative(s) expressed understanding.    - Discussed:     - Discussed with:  Patient      - Extended Intubation/Ventilatory Support Discussed: No.      - Patient is DNR/DNI Status: No    Use of blood products discussed: No .     Postoperative Care            Comments:                Fredy Hernández MD

## 2023-01-05 NOTE — BRIEF OP NOTE
[facility] [place]    Brief Operative Note    Pre-operative diagnosis: Choledocholithiasis [K80.50]  Post-operative diagnosis Likely passed stone. Patent cystic duct and gallbladder with stones    Procedure: Procedure(s):  ENDOSCOPIC RETROGRADE CHOLANGIOPANCREATOGRAPHY WITH BILIARY SPHINCTEROTOMY AND STENT PLACEMENT  Surgeon: Surgeon(s) and Role:     * Jewel Almanza MD - Primary  Anesthesia: General, indomethacin 100mg   Estimated Blood Loss: None    Drains: None  Specimens: * No specimens in log *  Findings:   None.  Complications: None.  Implants:   Implant Name Type Inv. Item Serial No.  Lot No. LRB No. Used Action   STENT ALMANZA PANCREA FLEX [id number] SGL PIGTAIL - [id number] Stent STENT ALMANZA PANCREA FLEX [id number] SGL PIGTAIL  Sunflower T19- N/A 1 Implanted     Biliary cannulation without pancreatic instrumentation. Nondilated bile duct, patent cystic duct and gallbladder with stones. No obvious stone. Limited biliary sphincterotomy and swept duct clear. 5F fallout biliary stent placed    REC:  Clears tonight, NPO after MN for cholecystectomy in AM  Avoid anticoagulation including heparin.  Xray in 4 weeks to assess for stent passage

## 2023-01-05 NOTE — CONSULTS
[facility]    Consult Note - General Surgery Service  Date of Admission:  1/4/2023  Consult Requested by: Dr. Crook  Reason for Consult: choledocholithiasis, assess for cholecystectomy    Assessment & Plan: Surgery   Duyen Jones is a 17 year old transgender female with PMHx of anxiety, hormone replacement use, and prior lap appendectomy (2019) who was admitted on 1/4/2023 to medicine with intermittent epigastric abdominal pain for last 5 days. US significant for dilated CBD with possible choledocholithiasis, no acute cholecystits. EGS consulted for consideration of cholecystectomy.     Afebrile. Initially tachycardic, but improved. No leukocytosis. Elevated transaminases (, AST 74), normal bilirubin. Normal lipase. On exam, has mild epigastric abdominal pain.     - GI consulted and possible ERCP today  - Will plan for laparoscopic cholecystectomy this admission, likely 1/6  - NPO at midnight  - No need for abx from our perspective given no cholecystitis    Drains: None     Code Status: Full Code      Clinically Significant Risk Factors Present on Admission     # Hypokalemia: Lowest K = 3.3 mmol/L in last 2 days, will replace as needed                      The patient's care was discussed with the Attending Physician, Dr. Garcia and Chief Resident/Fellow.    Sebastian Cooper MD  [facility]  Text page via [employer] Paging/Directory     ______________________________________________________________________    Chief Complaint   Abdominal pain    History is obtained from the patient    History of Present Illness   Duyen Jones is a 17 year old transgender female with PMHx of anxiety, hormone replacement use, and prior lap appendectomy (2019) who was admitted on 1/4/2023 with intermittent epigastric abdominal pain for last 5 days.    Five days ago while at work, she had an episode of severe  abdominal pain after eating. Pain was sharp and in the epigastric region. Resolved after a few hours. Patient woke up at 3am on Monday with sharp abdominal pain and ended up calling out from work. Pain continued all day and into Tuesday when they noticed their urine was discolored and darker despite adequate hydration. Endorsed nausea but no emesis. Denied chest pain, shortness of breath, palpitations, diarrhea or constipation. Given the constellation of symptoms, the patient went to see their PCP who ordered the US and recommended coming into the ED for evaluation after concern for choledochlithiasis was seen.       Past Medical History    Past Medical History:   Diagnosis Date     Anxiety        Past Surgical History   Past Surgical History:   Procedure Laterality Date     LAPAROSCOPIC APPENDECTOMY         Prior to Admission Medications   I have reviewed this patient's current medications       Social History   I have reviewed this patient's social history and updated it with pertinent information if needed.  Social History     Tobacco Use     Smoking status: Never     Smokeless tobacco: Never   Substance Use Topics     Alcohol use: Yes     Alcohol/week: 1.0 standard drink     Types: 1 Standard drinks or equivalent per week     Drug use: Not Currently     Types: Marijuana     Comment: Prior marijuana use       Family History   No significant family history, including no history of bleeding or clotting disorders    Allergies   No Known Allergies     Physical Exam   Vital Signs: Temp: 98.5  F (36.9  C) Temp src: Temporal BP: 110/60 Pulse: 67   Resp: 16 SpO2: 97 % O2 Device: None (Room air)    Weight: 175 lbs 0 oz   No intake or output data in the 24 hours ending 01/05/23 0830    General: Alert, in no acute distress.  Neuro: A&Ox3  HEENT: Normocephalic, atraumatic.  Neck: supple  Respiratory: Non-labored breathing on room air  Cardiovascular: Regular rate and rhythm.   Gastrointestinal: Abdomen soft, non-distended,  mild epigastric tenderness to palpation.  Genitourinary: Defered  MSK/Extremities: Moving all four extremities. No limb deformities. No peripheral edema.  Incisions/Skin: As noted above. No rashes or lesions appreciated.      Data     I have personally reviewed the following data over the past 24 hrs:    7.7  \   13.8   / 306     139 106 9.2 /  106 (H)   4.8 21 (L) 0.73 \       ALT: 169 (H) AST: 74 (H) AP: 103 TBILI: 0.5   ALB: 4.1 TOT PROTEIN: 7.1 LIPASE: 30

## 2023-01-05 NOTE — ANESTHESIA POSTPROCEDURE EVALUATION
Patient: Duyen Jones    Procedure: Procedure(s):  ENDOSCOPIC RETROGRADE CHOLANGIOPANCREATOGRAPHY WITH BILIARY SPHINCTEROTOMY AND STENT PLACEMENT       Anesthesia Type:  No value filed.    Note:  Disposition: Admission   Postop Pain Control: Uneventful            Sign Out: Well controlled pain   PONV: No   Neuro/Psych: Uneventful            Sign Out: Acceptable/Baseline neuro status   Airway/Respiratory: Uneventful            Sign Out: Acceptable/Baseline resp. status   CV/Hemodynamics: Uneventful            Sign Out: Acceptable CV status; No obvious hypovolemia; No obvious fluid overload   Other NRE: NONE   DID A NON-ROUTINE EVENT OCCUR? No           Last vitals:  Vitals Value Taken Time   /62 01/05/23 1445   Temp 36.8  C (98.2  F) 01/05/23 1409   Pulse 57 01/05/23 1446   Resp 6 01/05/23 1446   SpO2 100 % 01/05/23 1446   Vitals shown include unvalidated device data.    Electronically Signed By: Miguel A Sanchez  January 5, 2023  2:47 PM

## 2023-01-05 NOTE — ANESTHESIA CARE TRANSFER NOTE
Patient: Duyen Jones    Procedure: Procedure(s):  ENDOSCOPIC RETROGRADE CHOLANGIOPANCREATOGRAPHY WITH BILIARY SPHINCTEROTOMY AND STENT PLACEMENT       Diagnosis: Choledocholithiasis [K80.50]  Diagnosis Additional Information: No value filed.    Anesthesia Type:   No value filed.     Note:    Oropharynx: oropharynx clear of all foreign objects and spontaneously breathing  Level of Consciousness: awake  Oxygen Supplementation: room air    Independent Airway: airway patency satisfactory and stable    Vital Signs Stable: post-procedure vital signs reviewed and stable  Report to RN Given: handoff report given  Patient transferred to: PACU  Comments: Awake, VSS tolerated well  Handoff Report: Identifed the Patient, Identified the Reponsible Provider, Reviewed the pertinent medical history, Discussed the surgical course, Reviewed Intra-OP anesthesia mangement and issues during anesthesia, Set expectations for post-procedure period and Allowed opportunity for questions and acknowledgement of understanding      Vitals:  Vitals Value Taken Time   /64 01/05/23 1409   Temp     Pulse 67 01/05/23 1411   Resp     SpO2 99 % 01/05/23 1411   Vitals shown include unvalidated device data.    Electronically Signed By: SHE Knowles CRNA  January 5, 2023  2:12 PM

## 2023-01-05 NOTE — CONSULTS
GASTROENTEROLOGY CONSULTATION      Date of Admission:  1/4/2023          Chief Complaint:   We were asked by Fernando Chavez to evaluate this patient with choledocholithiasis           ASSESSMENT AND RECOMMENDATIONS:   Assessment:  Duyen Jones is a 17 year old adult with a history of prior laparoscopic appendectomy 2018 who presented with abdominal pain.     #Choledocholithiasis  Patient vitally stable, pain improving, no jaundice, no current concern for cholangiitis/cholecystitis. No evidence of anemia, no known family history of biliary disease. Likely triggered by estrogen supplementation. Patient currently NPO. Appreciate surgery consultation and co-management, patient might eventually need cholecystectomy before or after ERCP. We will coordinate with surgery so that the patient undergoes both procedures at the same appointment    Recommendations:  - Plan for ERCP for biliary drainage, keep NPO for now  - Will coordinate with surgery in case patient is scheduled for a cholecystectomy  - Please obtain INR (ordered for you)    Gastroenterology follow up recommendations: Pending clinical course      Patient care plan discussed with Dr. Richardson, GI staff physician. Thank you for involving us in this patient's care. Please do not hesitate to contact the GI service with any questions or concerns.     Chitra Keane M.D  Internal Medicine Resident  [id number]  Division of Gastroenterology   -------------------------------------------------------------------------------------------------------------------   History is obtained from the patient and the medical record.          History of Present Illness:   Duyen Jones is a 17 year old adult with PMH of prior laparoscopic appendectomy 2018 who presented with abdominal pain. Patient noticed intermittent abdominal pain about 1 week ago. Initially noticed after eating a possible bad burrito, thought she had food poisoning. Abd pain resolved but  then returned on Monday 1/2 and was severe to the point that patient had to call in sick to work. She was seen at [facility], where she was told she had abnormal liver labs. She also had abdominal x-ray showing significant stool and gas as well as an abdominal ultrasound that was concerning for choledocholithiasis. She received a call from her clinic today due to the ultrasound findings and was told to come to the ED for evaluation. When evaluated in the ED, patient only had mild abdominal pain, present in LLQ. Also reported some dark urine at home but otherwise no dysuria or change in urine volume/frequency. Has good appetite and oral intake. No nausea, vomiting, or diarrhea. Only medication she takes at home is HRT. Previously on escitalopram and planning to resume soon, but not currently on it at this time. Denies fevers/chills, SOB, chest pain. Patient notes that in May 2021 started estrogen supplementation, initially with a pill, and subsequently with an injection, starting November 2022.                  Past Medical History:   Reviewed and edited as appropriate  No past medical history on file.         Past Surgical History:   Reviewed and edited as appropriate   Past Surgical History:   Procedure Laterality Date     LAPAROSCOPIC APPENDECTOMY              Previous Endoscopy:   No results found for this or any previous visit.         Social History:   Reviewed and edited as appropriate  Social History     Socioeconomic History     Marital status: Single     Spouse name: Not on file     Number of children: Not on file     Years of education: Not on file     Highest education level: Not on file   Occupational History     Not on file   Tobacco Use     Smoking status: Not on file     Smokeless tobacco: Not on file   Substance and Sexual Activity     Alcohol use: Not on file     Drug use: Not on file     Sexual activity: Not on file   Other Topics Concern     Not on file   Social History Narrative     Not on file  "    Social Determinants of Health     Financial Resource Strain: Not on file   Food Insecurity: Not on file   Transportation Needs: Not on file   Physical Activity: Not on file   Stress: Not on file   Social Connections: Not on file   Intimate Partner Violence: Not on file   Housing Stability: Not on file            Family History:   Reviewed and edited as appropriate  Family History   Problem Relation Age of Onset     Cholelithiasis No family hx of      Cholecystitis No family hx of            Allergies:   Reviewed and edited as appropriate   No Known Allergies         Medications:     Current Facility-Administered Medications   Medication     lidocaine (LMX4) cream     lidocaine 1 % 0.1-1 mL     melatonin tablet 3 mg     sodium chloride (PF) 0.9% PF flush 3 mL     sodium chloride (PF) 0.9% PF flush 3 mL     No current outpatient medications on file.             Review of Systems:     A complete review of systems was performed and is negative except as noted in the HPI           Physical Exam:   /41   Pulse 68   Temp 98.4  F (36.9  C)   Resp 18   Ht 1.778 m (5' 10\")   Wt 79.4 kg (175 lb)   SpO2 98%   BMI 25.11 kg/m    Wt:   Wt Readings from Last 2 Encounters:   01/04/23 79.4 kg (175 lb) (76 %, Z= 0.72)*     * Growth percentiles are based on CDC (Boys, 2-20 Years) data.      Constitutional: cooperative, pleasant, not dyspneic/diaphoretic, no acute distress  Eyes: Sclera anicteric/injected  Ears/nose/mouth/throat: Normal oropharynx without ulcers or exudate, mucus membranes moist, hearing intact  Neck: supple, thyroid normal size  CV: No edema  Respiratory: Unlabored breathing  Lymph: No axillary, submandibular, supraclavicular or inguinal lymphadenopathy  Abd: Nondistended, +bs, no hepatosplenomegaly, no guarding, no rebound, tenderness on palpation over the epigastrium and RUQ, Ruelas sign (+)  Skin: warm, perfused, no jaundice  Neuro: AAO x 3, No asterixis  Psych: Normal affect  MSK: Normal gait       "   Data:   Labs and imaging below were independently reviewed and interpreted    BMP  Recent Labs   Lab 01/05/23  0653 01/04/23  1549    139   POTASSIUM 4.8 3.3*   CHLORIDE 106 102   ANDREW 9.3 9.3   CO2 21* 24   BUN 9.2 10.6   CR 0.73 0.72   * 97     CBC  Recent Labs   Lab 01/05/23  0653 01/04/23  1549   WBC 7.7 8.1   RBC 4.68 4.70   HGB 13.8 14.2   HCT 42.7 43.1   MCV 91 92   MCH 29.5 30.2   MCHC 32.3 32.9   RDW 11.9 11.9    327     INRNo lab results found in last 7 days.  LFTs  Recent Labs   Lab 01/05/23  0653 01/04/23  1549   ALKPHOS 103 121   AST 74* 66*   * 199*   BILITOTAL 0.5 0.8   PROTTOTAL 7.1 7.7   ALBUMIN 4.1 4.7      PANC  Recent Labs   Lab 01/04/23  1549   LIPASE 30       Imaging:    EXAMINATION: US ABDOMEN COMPLETE,  1/4/2023 1:38 PM      COMPARISON: None.     HISTORY: Generalized abdominal pain and elevated LFTs. Rule out  cholecystitis/cholelithiasis.      TECHNIQUE: The abdomen was scanned in standard fashion with  specialized ultrasound transducer(s) using both gray-scale and limited  color Doppler techniques.     FINDINGS:  Liver: The liver demonstrates normal homogeneous echotexture. No  evidence of a focal hepatic mass. The main portal vein is patent with  antegrade flow.     Gallbladder: Mobile, echogenic stones within the gallbladder.  Gallbladder wall thickness is within normal limits measuring 2.7 mm.  No pericholecystic fluid. Negative sonographic Ruelas's sign.     Bile Ducts: Mild intrahepatic biliary ductal dilation.  The common  bile duct measures 8 mm in diameter. There is an echogenic stone  identified in the common bile duct measuring 7.5 x 3 mm (last cine  series, image 103).     Pancreas: Visualized portions of the head and body of the pancreas are  unremarkable.      Kidneys: Both kidneys are of normal echotexture, without mass or  hydronephrosis.   The craniocaudal dimensions are: right- 10.8 cm,  left- 10.4 cm.     Spleen: The spleen is normal in size,   measuring 10.5 cm in sagittal  dimension.     Aorta and IVC: The visualized portions of the aorta and IVC are  unremarkable. The proximal aorta measures 1.5 cm in diameter and the  IVC measures 1.5 cm in diameter.     Fluid: No evidence of ascites or pleural effusions.                                                                      IMPRESSION: Common bile duct is dilated with mild intrahepatic biliary  ductal dilatation, possible calculus in the proximal common bile duct;  cholelithiasis without additional sonographic findings for acute  cholecystitis.

## 2023-01-06 ENCOUNTER — ANESTHESIA (OUTPATIENT)
Dept: SURGERY | Facility: CLINIC | Age: 18
End: 2023-01-06
Payer: COMMERCIAL

## 2023-01-06 LAB
ALBUMIN SERPL BCG-MCNC: 4 G/DL (ref 3.5–5.2)
ALP SERPL-CCNC: 91 U/L (ref 35–129)
ALT SERPL W P-5'-P-CCNC: 188 U/L (ref 10–50)
ANION GAP SERPL CALCULATED.3IONS-SCNC: 13 MMOL/L (ref 7–15)
AST SERPL W P-5'-P-CCNC: 98 U/L (ref 10–50)
BILIRUB SERPL-MCNC: 0.7 MG/DL
BUN SERPL-MCNC: 9 MG/DL (ref 6–20)
CALCIUM SERPL-MCNC: 9.5 MG/DL (ref 8.6–10)
CHLORIDE SERPL-SCNC: 104 MMOL/L (ref 98–107)
CREAT SERPL-MCNC: 0.7 MG/DL (ref 0.51–1.17)
DEPRECATED HCO3 PLAS-SCNC: 22 MMOL/L (ref 22–29)
ERYTHROCYTE [DISTWIDTH] IN BLOOD BY AUTOMATED COUNT: 11.5 % (ref 10–15)
GFR SERPL CREATININE-BSD FRML MDRD: >90 ML/MIN/1.73M2
GLUCOSE SERPL-MCNC: 106 MG/DL (ref 70–99)
HCT VFR BLD AUTO: 39.7 % (ref 35–53)
HGB BLD-MCNC: 13.5 G/DL (ref 11.7–17.7)
INR PPP: 0.95 (ref 0.85–1.15)
MCH RBC QN AUTO: 30.5 PG (ref 26.5–33)
MCHC RBC AUTO-ENTMCNC: 34 G/DL (ref 31.5–36.5)
MCV RBC AUTO: 90 FL (ref 78–100)
PLATELET # BLD AUTO: 317 10E3/UL (ref 150–450)
POTASSIUM SERPL-SCNC: 4 MMOL/L (ref 3.4–5.3)
PROT SERPL-MCNC: 6.8 G/DL (ref 6.4–8.3)
RBC # BLD AUTO: 4.43 10E6/UL (ref 3.8–5.9)
SODIUM SERPL-SCNC: 139 MMOL/L (ref 136–145)
WBC # BLD AUTO: 8.6 10E3/UL (ref 4–11)

## 2023-01-06 PROCEDURE — 250N000011 HC RX IP 250 OP 636: Performed by: ANESTHESIOLOGY

## 2023-01-06 PROCEDURE — 88304 TISSUE EXAM BY PATHOLOGIST: CPT | Mod: 26 | Performed by: PATHOLOGY

## 2023-01-06 PROCEDURE — 88304 TISSUE EXAM BY PATHOLOGIST: CPT | Mod: TC | Performed by: SURGERY

## 2023-01-06 PROCEDURE — 250N000011 HC RX IP 250 OP 636

## 2023-01-06 PROCEDURE — 710N000010 HC RECOVERY PHASE 1, LEVEL 2, PER MIN: Performed by: SURGERY

## 2023-01-06 PROCEDURE — 82310 ASSAY OF CALCIUM: CPT | Performed by: INTERNAL MEDICINE

## 2023-01-06 PROCEDURE — 85027 COMPLETE CBC AUTOMATED: CPT | Performed by: INTERNAL MEDICINE

## 2023-01-06 PROCEDURE — 85610 PROTHROMBIN TIME: CPT | Performed by: STUDENT IN AN ORGANIZED HEALTH CARE EDUCATION/TRAINING PROGRAM

## 2023-01-06 PROCEDURE — 99231 SBSQ HOSP IP/OBS SF/LOW 25: CPT | Mod: GC | Performed by: INTERNAL MEDICINE

## 2023-01-06 PROCEDURE — 250N000009 HC RX 250: Performed by: SURGERY

## 2023-01-06 PROCEDURE — 360N000076 HC SURGERY LEVEL 3, PER MIN: Performed by: SURGERY

## 2023-01-06 PROCEDURE — 370N000017 HC ANESTHESIA TECHNICAL FEE, PER MIN: Performed by: SURGERY

## 2023-01-06 PROCEDURE — 250N000011 HC RX IP 250 OP 636: Performed by: SURGERY

## 2023-01-06 PROCEDURE — 47562 LAPAROSCOPIC CHOLECYSTECTOMY: CPT | Mod: GC | Performed by: SURGERY

## 2023-01-06 PROCEDURE — 36415 COLL VENOUS BLD VENIPUNCTURE: CPT | Performed by: INTERNAL MEDICINE

## 2023-01-06 PROCEDURE — 272N000001 HC OR GENERAL SUPPLY STERILE: Performed by: SURGERY

## 2023-01-06 PROCEDURE — 258N000003 HC RX IP 258 OP 636

## 2023-01-06 PROCEDURE — 96374 THER/PROPH/DIAG INJ IV PUSH: CPT | Mod: 59

## 2023-01-06 PROCEDURE — 250N000009 HC RX 250

## 2023-01-06 PROCEDURE — G0378 HOSPITAL OBSERVATION PER HR: HCPCS

## 2023-01-06 PROCEDURE — 250N000013 HC RX MED GY IP 250 OP 250 PS 637

## 2023-01-06 PROCEDURE — 250N000013 HC RX MED GY IP 250 OP 250 PS 637: Performed by: INTERNAL MEDICINE

## 2023-01-06 PROCEDURE — 250N000025 HC SEVOFLURANE, PER MIN: Performed by: SURGERY

## 2023-01-06 PROCEDURE — 999N000141 HC STATISTIC PRE-PROCEDURE NURSING ASSESSMENT: Performed by: SURGERY

## 2023-01-06 PROCEDURE — 250N000013 HC RX MED GY IP 250 OP 250 PS 637: Performed by: SURGERY

## 2023-01-06 RX ORDER — ONDANSETRON 4 MG/1
4 TABLET, ORALLY DISINTEGRATING ORAL EVERY 30 MIN PRN
Status: DISCONTINUED | OUTPATIENT
Start: 2023-01-06 | End: 2023-01-06 | Stop reason: HOSPADM

## 2023-01-06 RX ORDER — ONDANSETRON 2 MG/ML
4 INJECTION INTRAMUSCULAR; INTRAVENOUS EVERY 30 MIN PRN
Status: DISCONTINUED | OUTPATIENT
Start: 2023-01-06 | End: 2023-01-06 | Stop reason: HOSPADM

## 2023-01-06 RX ORDER — FENTANYL CITRATE 50 UG/ML
25-50 INJECTION, SOLUTION INTRAMUSCULAR; INTRAVENOUS
Status: DISCONTINUED | OUTPATIENT
Start: 2023-01-06 | End: 2023-01-06 | Stop reason: HOSPADM

## 2023-01-06 RX ORDER — NALOXONE HYDROCHLORIDE 0.4 MG/ML
0.2 INJECTION, SOLUTION INTRAMUSCULAR; INTRAVENOUS; SUBCUTANEOUS
Status: DISCONTINUED | OUTPATIENT
Start: 2023-01-06 | End: 2023-01-07 | Stop reason: HOSPADM

## 2023-01-06 RX ORDER — PROPOFOL 10 MG/ML
INJECTION, EMULSION INTRAVENOUS PRN
Status: DISCONTINUED | OUTPATIENT
Start: 2023-01-06 | End: 2023-01-06

## 2023-01-06 RX ORDER — SODIUM CHLORIDE, SODIUM LACTATE, POTASSIUM CHLORIDE, CALCIUM CHLORIDE 600; 310; 30; 20 MG/100ML; MG/100ML; MG/100ML; MG/100ML
INJECTION, SOLUTION INTRAVENOUS CONTINUOUS PRN
Status: DISCONTINUED | OUTPATIENT
Start: 2023-01-06 | End: 2023-01-06

## 2023-01-06 RX ORDER — MEPERIDINE HYDROCHLORIDE 25 MG/ML
12.5 INJECTION INTRAMUSCULAR; INTRAVENOUS; SUBCUTANEOUS EVERY 5 MIN PRN
Status: DISCONTINUED | OUTPATIENT
Start: 2023-01-06 | End: 2023-01-06 | Stop reason: HOSPADM

## 2023-01-06 RX ORDER — SODIUM CHLORIDE, SODIUM LACTATE, POTASSIUM CHLORIDE, CALCIUM CHLORIDE 600; 310; 30; 20 MG/100ML; MG/100ML; MG/100ML; MG/100ML
INJECTION, SOLUTION INTRAVENOUS CONTINUOUS
Status: DISCONTINUED | OUTPATIENT
Start: 2023-01-06 | End: 2023-01-06 | Stop reason: HOSPADM

## 2023-01-06 RX ORDER — MEPERIDINE HYDROCHLORIDE 25 MG/ML
12.5 INJECTION INTRAMUSCULAR; INTRAVENOUS; SUBCUTANEOUS
Status: DISCONTINUED | OUTPATIENT
Start: 2023-01-06 | End: 2023-01-06 | Stop reason: HOSPADM

## 2023-01-06 RX ORDER — CEFAZOLIN SODIUM 2 G/100ML
2 INJECTION, SOLUTION INTRAVENOUS SEE ADMIN INSTRUCTIONS
Status: DISCONTINUED | OUTPATIENT
Start: 2023-01-06 | End: 2023-01-06 | Stop reason: HOSPADM

## 2023-01-06 RX ORDER — DIPHENHYDRAMINE HCL 25 MG
25 CAPSULE ORAL EVERY 6 HOURS PRN
Status: DISCONTINUED | OUTPATIENT
Start: 2023-01-06 | End: 2023-01-06 | Stop reason: HOSPADM

## 2023-01-06 RX ORDER — LABETALOL HYDROCHLORIDE 5 MG/ML
10 INJECTION, SOLUTION INTRAVENOUS
Status: DISCONTINUED | OUTPATIENT
Start: 2023-01-06 | End: 2023-01-06 | Stop reason: HOSPADM

## 2023-01-06 RX ORDER — HYDROMORPHONE HCL IN WATER/PF 6 MG/30 ML
0.2 PATIENT CONTROLLED ANALGESIA SYRINGE INTRAVENOUS EVERY 5 MIN PRN
Status: DISCONTINUED | OUTPATIENT
Start: 2023-01-06 | End: 2023-01-06 | Stop reason: HOSPADM

## 2023-01-06 RX ORDER — LIDOCAINE HYDROCHLORIDE 20 MG/ML
INJECTION, SOLUTION INFILTRATION; PERINEURAL PRN
Status: DISCONTINUED | OUTPATIENT
Start: 2023-01-06 | End: 2023-01-06

## 2023-01-06 RX ORDER — ESCITALOPRAM OXALATE 5 MG/1
5 TABLET ORAL AT BEDTIME
Status: DISCONTINUED | OUTPATIENT
Start: 2023-01-06 | End: 2023-01-07 | Stop reason: HOSPADM

## 2023-01-06 RX ORDER — ONDANSETRON 2 MG/ML
INJECTION INTRAMUSCULAR; INTRAVENOUS PRN
Status: DISCONTINUED | OUTPATIENT
Start: 2023-01-06 | End: 2023-01-06

## 2023-01-06 RX ORDER — FENTANYL CITRATE 50 UG/ML
INJECTION, SOLUTION INTRAMUSCULAR; INTRAVENOUS PRN
Status: DISCONTINUED | OUTPATIENT
Start: 2023-01-06 | End: 2023-01-06

## 2023-01-06 RX ORDER — ACETAMINOPHEN 325 MG/1
975 TABLET ORAL ONCE
Status: COMPLETED | OUTPATIENT
Start: 2023-01-06 | End: 2023-01-06

## 2023-01-06 RX ORDER — FENTANYL CITRATE 50 UG/ML
25 INJECTION, SOLUTION INTRAMUSCULAR; INTRAVENOUS EVERY 5 MIN PRN
Status: DISCONTINUED | OUTPATIENT
Start: 2023-01-06 | End: 2023-01-06 | Stop reason: HOSPADM

## 2023-01-06 RX ORDER — LIDOCAINE 40 MG/G
CREAM TOPICAL
Status: DISCONTINUED | OUTPATIENT
Start: 2023-01-06 | End: 2023-01-07 | Stop reason: HOSPADM

## 2023-01-06 RX ORDER — FENTANYL CITRATE 50 UG/ML
50 INJECTION, SOLUTION INTRAMUSCULAR; INTRAVENOUS EVERY 5 MIN PRN
Status: DISCONTINUED | OUTPATIENT
Start: 2023-01-06 | End: 2023-01-06 | Stop reason: HOSPADM

## 2023-01-06 RX ORDER — DIAZEPAM 10 MG/2ML
2.5 INJECTION, SOLUTION INTRAMUSCULAR; INTRAVENOUS
Status: DISCONTINUED | OUTPATIENT
Start: 2023-01-06 | End: 2023-01-06 | Stop reason: HOSPADM

## 2023-01-06 RX ORDER — CEFAZOLIN SODIUM 2 G/100ML
2 INJECTION, SOLUTION INTRAVENOUS
Status: COMPLETED | OUTPATIENT
Start: 2023-01-06 | End: 2023-01-06

## 2023-01-06 RX ORDER — HYDROMORPHONE HCL IN WATER/PF 6 MG/30 ML
0.4 PATIENT CONTROLLED ANALGESIA SYRINGE INTRAVENOUS EVERY 5 MIN PRN
Status: DISCONTINUED | OUTPATIENT
Start: 2023-01-06 | End: 2023-01-06 | Stop reason: HOSPADM

## 2023-01-06 RX ORDER — ESCITALOPRAM OXALATE 10 MG/1
5-20 TABLET ORAL DAILY
COMMUNITY

## 2023-01-06 RX ORDER — NALOXONE HYDROCHLORIDE 0.4 MG/ML
0.4 INJECTION, SOLUTION INTRAMUSCULAR; INTRAVENOUS; SUBCUTANEOUS
Status: DISCONTINUED | OUTPATIENT
Start: 2023-01-06 | End: 2023-01-07 | Stop reason: HOSPADM

## 2023-01-06 RX ORDER — ESCITALOPRAM OXALATE 5 MG/1
5 TABLET ORAL DAILY
Status: DISCONTINUED | OUTPATIENT
Start: 2023-01-07 | End: 2023-01-06

## 2023-01-06 RX ORDER — OXYCODONE HYDROCHLORIDE 5 MG/1
5 TABLET ORAL EVERY 4 HOURS PRN
Status: DISCONTINUED | OUTPATIENT
Start: 2023-01-06 | End: 2023-01-07 | Stop reason: HOSPADM

## 2023-01-06 RX ORDER — HEPARIN SODIUM 5000 [USP'U]/.5ML
5000 INJECTION, SOLUTION INTRAVENOUS; SUBCUTANEOUS
Status: DISCONTINUED | OUTPATIENT
Start: 2023-01-06 | End: 2023-01-06

## 2023-01-06 RX ORDER — OXYCODONE HYDROCHLORIDE 5 MG/1
5 TABLET ORAL EVERY 4 HOURS PRN
Qty: 12 TABLET | Refills: 0 | Status: SHIPPED | OUTPATIENT
Start: 2023-01-06

## 2023-01-06 RX ORDER — DIPHENHYDRAMINE HYDROCHLORIDE 50 MG/ML
25 INJECTION INTRAMUSCULAR; INTRAVENOUS EVERY 6 HOURS PRN
Status: DISCONTINUED | OUTPATIENT
Start: 2023-01-06 | End: 2023-01-06 | Stop reason: HOSPADM

## 2023-01-06 RX ORDER — HYDRALAZINE HYDROCHLORIDE 20 MG/ML
2.5-5 INJECTION INTRAMUSCULAR; INTRAVENOUS EVERY 10 MIN PRN
Status: DISCONTINUED | OUTPATIENT
Start: 2023-01-06 | End: 2023-01-06 | Stop reason: HOSPADM

## 2023-01-06 RX ORDER — HALOPERIDOL 5 MG/ML
1 INJECTION INTRAMUSCULAR
Status: DISCONTINUED | OUTPATIENT
Start: 2023-01-06 | End: 2023-01-06 | Stop reason: HOSPADM

## 2023-01-06 RX ORDER — OXYCODONE HYDROCHLORIDE 5 MG/1
5-10 TABLET ORAL EVERY 4 HOURS PRN
Status: DISCONTINUED | OUTPATIENT
Start: 2023-01-06 | End: 2023-01-06

## 2023-01-06 RX ORDER — DEXAMETHASONE SODIUM PHOSPHATE 4 MG/ML
INJECTION, SOLUTION INTRA-ARTICULAR; INTRALESIONAL; INTRAMUSCULAR; INTRAVENOUS; SOFT TISSUE PRN
Status: DISCONTINUED | OUTPATIENT
Start: 2023-01-06 | End: 2023-01-06

## 2023-01-06 RX ADMIN — SODIUM CHLORIDE, POTASSIUM CHLORIDE, SODIUM LACTATE AND CALCIUM CHLORIDE: 600; 310; 30; 20 INJECTION, SOLUTION INTRAVENOUS at 08:58

## 2023-01-06 RX ADMIN — ACETAMINOPHEN 650 MG: 325 TABLET, FILM COATED ORAL at 14:07

## 2023-01-06 RX ADMIN — FENTANYL CITRATE 100 MCG: 50 INJECTION, SOLUTION INTRAMUSCULAR; INTRAVENOUS at 09:05

## 2023-01-06 RX ADMIN — ONDANSETRON 4 MG: 2 INJECTION INTRAMUSCULAR; INTRAVENOUS at 10:22

## 2023-01-06 RX ADMIN — SUGAMMADEX 200 MG: 100 INJECTION, SOLUTION INTRAVENOUS at 10:28

## 2023-01-06 RX ADMIN — Medication 10 MG: at 09:46

## 2023-01-06 RX ADMIN — PROPOFOL 100 MG: 10 INJECTION, EMULSION INTRAVENOUS at 09:07

## 2023-01-06 RX ADMIN — ONDANSETRON 4 MG: 2 INJECTION INTRAMUSCULAR; INTRAVENOUS at 14:15

## 2023-01-06 RX ADMIN — CEFAZOLIN 2 G: 10 INJECTION, POWDER, FOR SOLUTION INTRAVENOUS at 08:59

## 2023-01-06 RX ADMIN — FENTANYL CITRATE 25 MCG: 50 INJECTION, SOLUTION INTRAMUSCULAR; INTRAVENOUS at 11:00

## 2023-01-06 RX ADMIN — OXYCODONE HYDROCHLORIDE 5 MG: 5 TABLET ORAL at 11:42

## 2023-01-06 RX ADMIN — ESCITALOPRAM 5 MG: 5 TABLET, FILM COATED ORAL at 21:12

## 2023-01-06 RX ADMIN — MIDAZOLAM 2 MG: 1 INJECTION INTRAMUSCULAR; INTRAVENOUS at 08:53

## 2023-01-06 RX ADMIN — LIDOCAINE HYDROCHLORIDE 100 MG: 20 INJECTION, SOLUTION INFILTRATION; PERINEURAL at 09:06

## 2023-01-06 RX ADMIN — FENTANYL CITRATE 25 MCG: 50 INJECTION, SOLUTION INTRAMUSCULAR; INTRAVENOUS at 11:18

## 2023-01-06 RX ADMIN — FENTANYL CITRATE 50 MCG: 50 INJECTION, SOLUTION INTRAMUSCULAR; INTRAVENOUS at 09:29

## 2023-01-06 RX ADMIN — ACETAMINOPHEN 325MG 975 MG: 325 TABLET ORAL at 08:28

## 2023-01-06 RX ADMIN — DEXAMETHASONE SODIUM PHOSPHATE 6 MG: 4 INJECTION, SOLUTION INTRA-ARTICULAR; INTRALESIONAL; INTRAMUSCULAR; INTRAVENOUS; SOFT TISSUE at 09:07

## 2023-01-06 RX ADMIN — OXYCODONE HYDROCHLORIDE 5 MG: 5 TABLET ORAL at 19:17

## 2023-01-06 RX ADMIN — Medication 50 MG: at 09:07

## 2023-01-06 ASSESSMENT — ACTIVITIES OF DAILY LIVING (ADL)
ADLS_ACUITY_SCORE: 18
ADLS_ACUITY_SCORE: 20
ADLS_ACUITY_SCORE: 18
ADLS_ACUITY_SCORE: 20

## 2023-01-06 NOTE — BRIEF OP NOTE
[facility]    Brief Operative Note    Pre-operative diagnosis: Choledocholithiasis [K80.50]  Post-operative diagnosis Same as pre-operative diagnosis    Procedure: Procedure(s):  CHOLECYSTECTOMY, LAPAROSCOPIC  Surgeon: Surgeon(s) and Role:     * Jose Huffman MD - Primary     * Roxie Quigley MD - Resident - Assisting     * Sebastian Cooper MD - Resident - Assisting  Anesthesia: General   Estimated Blood Loss: 10ml    Drains: None  Specimens:   ID Type Source Tests Collected by Time Destination   1 : Gallbladder Tissue Gallbladder SURGICAL PATHOLOGY EXAM Jose Huffman MD 1/6/2023  9:57 AM      Findings:   minimal inflammation. Gallbladder containing small stones..  Complications: None.  Implants: * No implants in log *    - return to floor  - advance diet as tolerated  - no need for abx from surgery standpoint  - okay to discharge from surgery standpoint once tolerating diet and pain controlled on po meds

## 2023-01-06 NOTE — PLAN OF CARE
"Neuro: A/Ox4 calls appropriately.   Respiratory: on room air denies SOB.   Cardiac: WDL denies cardiac chest pain.   Diet: advanced to regular.   GI/: reports flatus, faint BS. Voiding with AUOP  Incision/Drains: abdominal lap site x4 with liquid bandage REMI.   IV Access: PIV x2 to LUE  Pain: reports pain to incision site managed with tylenol and oxycodone, ice pack applied.   Labs: reviewed  VS: /47 (BP Location: Right arm)   Pulse 66   Temp 98.4  F (36.9  C) (Oral)   Resp 14   Ht 1.778 m (5' 10\")   Wt 79.4 kg (175 lb)   SpO2 97%   BMI 25.11 kg/m    Activity: up ad tiffany to bathroom.   New this shift: cholecystectomy today.   Plan: manage pain.       "

## 2023-01-06 NOTE — ANESTHESIA CARE TRANSFER NOTE
Patient: Duyen Jones    Procedure: Procedure(s):  CHOLECYSTECTOMY, LAPAROSCOPIC       Diagnosis: Choledocholithiasis [K80.50]  Diagnosis Additional Information: No value filed.    Anesthesia Type:   General     Note:    Oropharynx: oropharynx clear of all foreign objects and spontaneously breathing  Level of Consciousness: awake  Oxygen Supplementation: nasal cannula  Level of Supplemental Oxygen (L/min / FiO2): 3  Independent Airway: airway patency satisfactory and stable    Vital Signs Stable: post-procedure vital signs reviewed and stable  Report to RN Given: handoff report given  Patient transferred to: PACU    Handoff Report: Identifed the Patient, Identified the Reponsible Provider, Reviewed the pertinent medical history, Discussed the surgical course, Reviewed Intra-OP anesthesia mangement and issues during anesthesia, Set expectations for post-procedure period and Allowed opportunity for questions and acknowledgement of understanding      Vitals:  Vitals Value Taken Time   /52 01/06/23 1047   Temp     Pulse 74 01/06/23 1049   Resp 11 01/06/23 1049   SpO2 100 % 01/06/23 1049   Vitals shown include unvalidated device data.    Electronically Signed By: SHE Nam CRNA  January 6, 2023  10:50 AM

## 2023-01-06 NOTE — ANESTHESIA POSTPROCEDURE EVALUATION
Patient: Duyen Jones    Procedure: Procedure(s):  CHOLECYSTECTOMY, LAPAROSCOPIC       Anesthesia Type:  General    Note:  Disposition: Inpatient   Postop Pain Control: Uneventful            Sign Out: Well controlled pain   PONV: No   Neuro/Psych: Uneventful            Sign Out: Acceptable/Baseline neuro status   Airway/Respiratory: Uneventful            Sign Out: Acceptable/Baseline resp. status   CV/Hemodynamics: Uneventful            Sign Out: Acceptable CV status; No obvious hypovolemia; No obvious fluid overload   Other NRE: NONE   DID A NON-ROUTINE EVENT OCCUR? No           Last vitals:  Vitals Value Taken Time   /60 01/06/23 1130   Temp 36.8  C (98.2  F) 01/06/23 1045   Pulse 75 01/06/23 1131   Resp 10 01/06/23 1131   SpO2 99 % 01/06/23 1131   Vitals shown include unvalidated device data.    Electronically Signed By: Miguel A Sanchez  January 6, 2023  11:32 AM

## 2023-01-06 NOTE — OP NOTE
[facility]    Operative Note    Pre-operative diagnosis: Choledocholithiasis [K80.50]   Post-operative diagnosis Same   Procedure: Procedure(s):  CHOLECYSTECTOMY, LAPAROSCOPIC   Surgeon: Surgeon(s) and Role:     * Jose Huffman MD - Primary     * Roxie Quigley MD - Resident - Assisting     * Sebastian Cooper MD - Resident - Assisting   Anesthesia: General    Estimated blood loss: 10ml   Drains: None   Specimens: ID Type Source Tests Collected by Time Destination   1 : Gallbladder Tissue Gallbladder SURGICAL PATHOLOGY EXAM Jose Huffman MD 1/6/2023  9:57 AM       Findings: minimal inflammation. Gallbladder containing small stones.   Complications: None.   Implants: None.       OPERATIVE INDICATIONS:  Duyen Jones is a 17 year old adult with a history of choledocholithiasis s/p ERCP on 1/5.  After understanding the risks and benefits of proceeding with surgery, the patient has an indication for laparoscopic cholecystectomy and consented to undergo surgery.    I reviewed the risks of surgery with Duyen Jones. Specific risks related to laparoscopic cholecystectomy include bile duct injury (3/1000), bile leak (10/1000), retained common bile duct stone (10/1000), postcholecystectomy diarrhea (1-2%) and these complications may require additional treatment.    OPERATIVE DETAILS:      The patient was brought to the operating room and prepared in a routine fashion.  A timeout was performed prior to surgery and documented by the nursing team.     Under the benefits of general anesthesia, a supraumbilical yani cut down was performed and pneumoperitoneum was established using carbon dioxide gas to a maximum pressure of 15 mmHg.  Three additional 5mm ports were placed under direct visualization, two in the RUQ inferior to the costal margin and one in the xiphoid area.     The patient was moved into a steep reverse Trendelenberg  position. The gallbladder was grasped at its fundus and retracted cephalad.  It was also grasped at its infundibulum and retracted laterally.       Findings related to the gallbladder are as noted above.     A complete dissection starting on the gallbladder, identifying the cystic artery on the surface of the gallbladder, was performed.  The cystic artery in this manner was  away from the gallbladder and the infundibulum of the gallbladder and the junction of gallbladder and cystic duct was clearly and completely identified with blunt dissection.  All of this dissection was performed on the gallbladder wall and clearly above the triangle of Calot.     Next, the peritoneum was divided along the lateral aspect of the gallbladder. A complete dissection of the upper aspect of the triangle of Calot was performed and the critical view of safety was achieved.     The cystic artery and cystic duct were clipped securely and divided between clips. The gallbladder was then removed out of its fossa using electrocautery.  It was placed into an Endocatch bag and removed from the abdomen. Complete hemostasis was achieved.     The umbilical incision was closed using 0 Vicryl suture in figure of eight fashion. The skin was closed using 4-0 monocryl suture and skin glue was applied.     Dr. Huffman was present for all critical components of the operation and all needle and sponge counts were correct x2 at the end of the procedure.    Roxie Quigley MD, MS  General Surgery, PGY-7  Pg [phone]

## 2023-01-06 NOTE — PROGRESS NOTES
Vitals: Temp: 97.7  F (36.5  C) Temp src: Oral BP: 113/41 Pulse: 66   Resp: 14 SpO2: 95 % O2 Device: None (Room air)    Neuro: Alert & oriented x4. Calls appropriately.   Cardiac: WDL, denies cardiac chest pain.   Respiratory: on RA, denies SOB.   GI/: Voiding spontaneously. LBM 1/5/23.  Activity: Independent.   Diet/Nausea: Clear liquid diet. NPO starting midnight.   Pain: Denies.   Skin: no defecit noted.   LDA: PIV SL  Plan: lap cholecystomy tomorrow. Pt showered. Continue with POC.    Admitted/transferred from:   2 RN full   skin assessment completed by Vipin Hdez RN and Afshin STRINGER RN.  Skin assessment finding: skin intact, no problems   Interventions/actions: other not adjusment needed     Bedside Emergency Equipment Present:  Suction Regulator: Yes  Suction Canister: Yes  Tubing between Regulator and Canister: Yes  O2 Regulator with Tree: Yes  Ambu Bag: Yes

## 2023-01-06 NOTE — PROGRESS NOTES
[facility]    Medicine Progress Note - Medicine Service, MAROON TEAM 4    Date of Admission:  1/4/2023    Assessment & Plan   Duyen Jones is a 17 year-old transgender female with PMH of prior laparoscopic appendectomy 2018 who presented with abdominal pain. Abdominal US findings concerning for choledocholithiasis. ERCP today and getting laparoscopic cholecystectomy on 1/6.      # Abdominal pain  # Choledocholithiasis  - GI panc bili consulted, appreciate recs  - ERCP completed with Dr. Almanza on 1/5              > nondilated bile duct, patent CD, gallbladder with stones              > no obvious stone. Limited biliary sphincterotomy and swept duct clear, biliary stent placed  - general surgery consulted for cholecystectomy this admission   > Lap denise completed 1/6  - FLD and ADAT  - pain: tylenol PRN, oxycodone PRN   - nausea: zofran PRN     # Hypokalemia: resolved  K low at 3.3 on arrival, repleted.      # Abnormal urinalysis  Patient reporting darker urine at home, but no dysuria, decreased urine output, or increased urinary frequency. UA on admission showing hematuria with 54 RBC. Hgb normal. Unclear etiology at this time.   - repeat UA  - consider outpatient follow up        Diet: Advance Diet as Tolerated: Full Liquid Diet; Regular Diet Adult    DVT Prophylaxis: Ambulate every shift  Acosta Catheter: Not present  Lines: None     Cardiac Monitoring: None  Code Status: Full Code        Disposition Plan      Expected Discharge Date: 01/07/2023        Discharge Comments: anticipate discharge home after ERCP and cholecystectomy          Aggie Henao MD  Medicine Service, ROBIN TEAM 4  [facility]  Securely message with [employer] (more info)  Text page via [employer] Paging/Directory   See signed in provider for up to date coverage  information  ______________________________________________________________________    Interval History   No acute events overnight. Pt went to OR today, lap denise completed and uneventful. Seen postprocedure, denied nausea, feeling some discomfort from insufflation during procedure. No dyspnea, feels hungry. Partner bedside and supportive. Capnography in place.    Physical Exam   Vital Signs: Temp: 98.6  F (37  C) Temp src: Oral BP: 116/57 Pulse: 60   Resp: 12 SpO2: 98 % O2 Device: None (Room air) Oxygen Delivery: 2 LPM  Weight: 175 lbs 0 oz  Gen: NAD, lying comfortably in bed, pleasant and cooperative with interview and exam  HEENT: normocephalic, no scleral icterus, no perioral lesions, oral mucosa moist  CV: RRR at time of exam  Pulm: good air movement bilaterally without wheezing  Abd: soft, +bs, nontender to palpation diffusely, nondistended  LE: no edema bilaterally  Skin: no rash or jaundice on limited exam  Neuro: AOx3, no tremor, gait stable  Psych: mood-affect congruent    Data     I have personally reviewed the following data over the past 24 hrs:    8.6  \   13.5   / 317     139 104 9.0 /  106 (H)   4.0 22 0.70 \       ALT: 188 (H) AST: 98 (H) AP: 91 TBILI: 0.7   ALB: 4.0 TOT PROTEIN: 6.8 LIPASE: N/A       INR:  0.95 PTT:  N/A   D-dimer:  N/A Fibrinogen:  N/A        10-May-2017 12:20

## 2023-01-06 NOTE — ANESTHESIA PROCEDURE NOTES
Airway       Patient location during procedure: OR       Procedure Start/Stop Times: 1/6/2023 9:09 AM  Staff -        Anesthesiologist:  Viraj Boyle MD       CRNA: Alonso Ndiaye APRN CRNA       Performed By: CRNA  Consent for Airway        Urgency: elective  Indications and Patient Condition       Indications for airway management: mirta-procedural       Induction type:intravenous       Mask difficulty assessment: 1 - vent by mask    Final Airway Details       Final airway type: endotracheal airway       Successful airway: ETT - single and Oral  Endotracheal Airway Details        ETT size (mm): 7.5       Cuffed: yes       Successful intubation technique: direct laryngoscopy       DL Blade Type: MAC 3       Grade View of Cords: 1       Adjucts: stylet       Position: Right       Measured from: gums/teeth       Secured at (cm): 22       Bite block used: None    Post intubation assessment        Placement verified by: capnometry, equal breath sounds and chest rise        Number of attempts at approach: 1       Number of other approaches attempted: 0       Secured with: pink tape       Ease of procedure: easy       Dentition: Intact    Medication(s) Administered   Medication Administration Time: 1/6/2023 9:09 AM

## 2023-01-06 NOTE — INTERVAL H&P NOTE
I have reviewed the surgical (or preoperative) H&P that is linked to this encounter, and examined the patient. There are no significant changes    Clinical Conditions Present on Arrival:  Clinically Significant Risk Factors Present on Admission          # Hypokalemia: Lowest K = 3.3 mmol/L in last 2 days, will replace as needed

## 2023-01-06 NOTE — PROGRESS NOTES
BRIEF GI NOTE    Patient status post ERCP yesterday without definitely choledocholithiasis. S/p sphincterotomy with duct clearance and placement of temporary biliary stent. Patient doing well this AM without signs/symptoms of post ERCP pancreatitis or sphincterotomy bleed. Planning for OR later today for laparoscopic cholecystectomy.    RECOMMENDATIONS:  - Proceed with lap denise  - We will arrange 4 week X-ray to confirm stent passage  - GI will sign off    Maximo Hernandez MD  GI Fellow    Above discussed with Dr. Richardson

## 2023-01-06 NOTE — OR NURSING
This RN went to 7B and Sho transferred herself from the stretcher to her bed. After sitting in her bed she reported some pain in the middle of her chest, VSS, radial pulse regular. She said it had just started after getting up. After about 10 minutes, she said that the discomfort was subsiding as she rested. This was reported to staff on 7B to report to her nurse, Emiliano.

## 2023-01-06 NOTE — PLAN OF CARE
Vital signs:  Temp: 97  F (36.1  C) Temp src: Oral BP: 108/40 Pulse: 58   Resp: 14 SpO2: 98 % O2 Device: None (Room air)     Activity: Up independently.   Neuros: A&O x4.   Cardiac: WDL.   Respiratory: WDL on RA. Denies SOB.  GI/: Voiding spontaneously. +BS, passing flatus.   Diet: NPO since MN.   Lines: Left PIV SL.   Pain/nausea: Denies.  Plan: OR today for cholecystectomy at 12 PM.

## 2023-01-06 NOTE — ANESTHESIA PREPROCEDURE EVALUATION
Anesthesia Pre-Procedure Evaluation    Patient: Duyen Jones   MRN: [id number] : 2005        Procedure : Procedure(s):  CHOLECYSTECTOMY, LAPAROSCOPIC          Past Medical History:   Diagnosis Date     Anxiety       Past Surgical History:   Procedure Laterality Date     LAPAROSCOPIC APPENDECTOMY  2019      No Known Allergies   Social History     Tobacco Use     Smoking status: Never     Smokeless tobacco: Never   Substance Use Topics     Alcohol use: Yes     Alcohol/week: 1.0 standard drink     Types: 1 Standard drinks or equivalent per week      Wt Readings from Last 1 Encounters:   23 79.4 kg (175 lb) (76 %, Z= 0.72)*     * Growth percentiles are based on [facility] (Boys, 2-20 Years) data.        Anesthesia Evaluation   Pt has had prior anesthetic. Type: General.    No history of anesthetic complications       ROS/MED HX  ENT/Pulmonary:  - neg pulmonary ROS     Neurologic:  - neg neurologic ROS     Cardiovascular:  - neg cardiovascular ROS     METS/Exercise Tolerance:     Hematologic:  - neg hematologic  ROS     Musculoskeletal:  - neg musculoskeletal ROS     GI/Hepatic:     (+) cholecystitis/cholelithiasis,     Renal/Genitourinary: Comment: Transgender woman      Endo:       Psychiatric/Substance Use:       Infectious Disease:  - neg infectious disease ROS     Malignancy:  - neg malignancy ROS     Other:  - neg other ROS          Physical Exam    Airway        Mallampati: II   TM distance: > 3 FB   Neck ROM: full   Mouth opening: > 3 cm    Respiratory Devices and Support         Dental  no notable dental history       B=Bridge, C=Chipped, L=Loose, M=Missing    Cardiovascular   cardiovascular exam normal          Pulmonary   pulmonary exam normal                OUTSIDE LABS:  CBC:   Lab Results   Component Value Date    WBC 7.7 2023    WBC 8.1 2023    HGB 13.8 2023    HGB 14.2 2023    HCT 42.7 2023    HCT 43.1 2023     2023      01/04/2023     BMP:   Lab Results   Component Value Date     01/05/2023     01/04/2023    POTASSIUM 4.8 01/05/2023    POTASSIUM 3.3 (L) 01/04/2023    CHLORIDE 106 01/05/2023    CHLORIDE 102 01/04/2023    CO2 21 (L) 01/05/2023    CO2 24 01/04/2023    BUN 9.2 01/05/2023    BUN 10.6 01/04/2023    CR 0.73 01/05/2023    CR 0.72 01/04/2023     (H) 01/05/2023     (H) 01/05/2023     COAGS:   Lab Results   Component Value Date    INR 0.92 01/05/2023     POC: No results found for: BGM, HCG, HCGS  HEPATIC:   Lab Results   Component Value Date    ALBUMIN 4.1 01/05/2023    PROTTOTAL 7.1 01/05/2023     (H) 01/05/2023    AST 74 (H) 01/05/2023    ALKPHOS 103 01/05/2023    BILITOTAL 0.5 01/05/2023     OTHER:   Lab Results   Component Value Date    ANDREW 9.3 01/05/2023    MAG 2.1 01/05/2023    LIPASE 30 01/04/2023       Anesthesia Plan    ASA Status:  1   NPO Status:  NPO Appropriate    Anesthesia Type: General.     - Airway: ETT   Induction: Intravenous, Propofol.   Maintenance: Balanced.        Consents    Anesthesia Plan(s) and associated risks, benefits, and realistic alternatives discussed. Questions answered and patient/representative(s) expressed understanding.    - Discussed:     - Discussed with:  Patient      - Extended Intubation/Ventilatory Support Discussed: No.      - Patient is DNR/DNI Status: No    Use of blood products discussed: No .     Postoperative Care    Pain management: IV analgesics.   PONV prophylaxis: Ondansetron (or other 5HT-3), Dexamethasone or Solumedrol     Comments:                Viraj Boyle MD

## 2023-01-07 VITALS
BODY MASS INDEX: 25.05 KG/M2 | RESPIRATION RATE: 16 BRPM | HEIGHT: 70 IN | HEART RATE: 78 BPM | TEMPERATURE: 98.4 F | DIASTOLIC BLOOD PRESSURE: 44 MMHG | WEIGHT: 175 LBS | SYSTOLIC BLOOD PRESSURE: 107 MMHG | OXYGEN SATURATION: 98 %

## 2023-01-07 LAB
ALBUMIN SERPL BCG-MCNC: 3.9 G/DL (ref 3.5–5.2)
ALP SERPL-CCNC: 79 U/L (ref 35–129)
ALT SERPL W P-5'-P-CCNC: 137 U/L (ref 10–50)
ANION GAP SERPL CALCULATED.3IONS-SCNC: 13 MMOL/L (ref 7–15)
AST SERPL W P-5'-P-CCNC: 62 U/L (ref 10–50)
BILIRUB SERPL-MCNC: 0.4 MG/DL
BUN SERPL-MCNC: 9.2 MG/DL (ref 6–20)
CALCIUM SERPL-MCNC: 8.9 MG/DL (ref 8.6–10)
CHLORIDE SERPL-SCNC: 104 MMOL/L (ref 98–107)
CREAT SERPL-MCNC: 0.74 MG/DL (ref 0.51–1.17)
DEPRECATED HCO3 PLAS-SCNC: 22 MMOL/L (ref 22–29)
ERYTHROCYTE [DISTWIDTH] IN BLOOD BY AUTOMATED COUNT: 11.8 % (ref 10–15)
GFR SERPL CREATININE-BSD FRML MDRD: >90 ML/MIN/1.73M2
GLUCOSE SERPL-MCNC: 99 MG/DL (ref 70–99)
HCT VFR BLD AUTO: 40.1 % (ref 35–53)
HGB BLD-MCNC: 13.1 G/DL (ref 11.7–17.7)
MCH RBC QN AUTO: 30 PG (ref 26.5–33)
MCHC RBC AUTO-ENTMCNC: 32.7 G/DL (ref 31.5–36.5)
MCV RBC AUTO: 92 FL (ref 78–100)
PLATELET # BLD AUTO: 296 10E3/UL (ref 150–450)
POTASSIUM SERPL-SCNC: 3.6 MMOL/L (ref 3.4–5.3)
PROT SERPL-MCNC: 6.5 G/DL (ref 6.4–8.3)
RBC # BLD AUTO: 4.37 10E6/UL (ref 3.8–5.9)
SODIUM SERPL-SCNC: 139 MMOL/L (ref 136–145)
WBC # BLD AUTO: 9.2 10E3/UL (ref 4–11)

## 2023-01-07 PROCEDURE — 250N000013 HC RX MED GY IP 250 OP 250 PS 637: Performed by: SURGERY

## 2023-01-07 PROCEDURE — 85018 HEMOGLOBIN: CPT | Performed by: SURGERY

## 2023-01-07 PROCEDURE — 36415 COLL VENOUS BLD VENIPUNCTURE: CPT | Performed by: SURGERY

## 2023-01-07 PROCEDURE — G0378 HOSPITAL OBSERVATION PER HR: HCPCS

## 2023-01-07 PROCEDURE — 80053 COMPREHEN METABOLIC PANEL: CPT | Performed by: SURGERY

## 2023-01-07 PROCEDURE — 99238 HOSP IP/OBS DSCHRG MGMT 30/<: CPT | Performed by: INTERNAL MEDICINE

## 2023-01-07 RX ORDER — ACETAMINOPHEN 325 MG/1
650 TABLET ORAL EVERY 4 HOURS PRN
Qty: 12 TABLET | Refills: 0 | Status: SHIPPED | OUTPATIENT
Start: 2023-01-07

## 2023-01-07 RX ADMIN — ACETAMINOPHEN 650 MG: 325 TABLET, FILM COATED ORAL at 03:58

## 2023-01-07 RX ADMIN — SPIRONOLACTONE 125 MG: 25 TABLET, FILM COATED ORAL at 09:20

## 2023-01-07 ASSESSMENT — ACTIVITIES OF DAILY LIVING (ADL)
ADLS_ACUITY_SCORE: 20

## 2023-01-07 NOTE — DISCHARGE SUMMARY
[facility]  Hospitalist Discharge Summary      Date of Admission:  1/4/2023  Date of Discharge:  1/7/2023  Discharging Provider: Aggie Henao MD  Discharge Service: Medicine Service, [facility] TEAM 4    Discharge Diagnoses   # Abdominal pain  # Choledocholithiasis  # Abnormal urinalysis    Follow-ups Needed After Discharge   Follow-up Appointments     Follow Up and recommended labs and tests      Please follow up with your primary care provider at [place] about a week   after surgery. A clinic nurse from general surgery will call you in the   next few days to check in after surgery. Please feel free to call our   clinic if you'd like to be see in person for follow up. Clinic number:   [phone]         Additional follow-up instructions/to-do's for PCP: Please repeat a UA to assess for persistent hematuria, moderate RBCs on spot UA during admission. Please ensure patient has abdominal xray in 4 weeks (around 2/2/23) to ensure biliary stent passage.   --Pt follows at [facility] and plans to call and make an appointment and follow up there. Discussed directly with patient as well recommendations for repeat UA when she sees PCP, and an abdominal xray in 4 weeks (PCP would order).    Unresulted Labs Ordered in the Past 30 Days of this Admission     Date and Time Order Name Status Description    1/6/2023 10:08 AM Surgical Pathology Exam In process           Discharge Disposition   Discharged to home  Condition at discharge: Stable      Hospital Course   Duyen Jones is a 17 year-old transgender female with PMH of prior laparoscopic appendectomy 2018 who presented with abdominal pain. Abdominal US findings concerning for choledocholithiasis. ERCP today and getting laparoscopic cholecystectomy on 1/6.      # Abdominal pain  # Choledocholithiasis  GI panc bili team consulted, appreciate recs. ERCP completed with Dr. Almanza on 1/5/23, no obvious stone.  Limited biliary sphincterotomy and swept duct clear, biliary stent placed. Nondilated bile duct, patent CD, gallbladder with stones. General Surgery consulted for cholecystectomy this admission and lap denise completed 1/6/23. Diet advanced to regular, pain controlled and nausea resolved by discharge. Activity restrictions provided by General Surgery. Note for work provided. Pt will need abdominal xray in 4 weeks (around 2/2/23) to ensure biliary stent has passed.     # Hypokalemia: resolved     # Abnormal urinalysis  Patient reporting darker urine at home, but no dysuria, decreased urine output, or increased urinary frequency. UA on admission showing hematuria with 54 RBC. Hgb normal. Unclear etiology at this time. Considered repeat UA during admission but Pt had elise catheter placement for procedures and would confound results. Recommend repeat UA outpatient with PCP to see if hematuria persisting.    Consultations This Hospital Stay   GI PANCREATICOBILIARY ADULT IP CONSULT  SURGERY GENERAL ADULT IP CONSULT    Code Status   Full Code    Time Spent on this Encounter   I, Aggie Henao MD, personally saw the patient today and spent less than or equal to 30 minutes discharging this patient.       Aggie Henao MD  [facility] [address] [address]  Phone: [phone]  ______________________________________________________________________    Physical Exam   Vital Signs: Temp: 98.4  F (36.9  C) Temp src: Oral BP: 107/44 Pulse: 78   Resp: 16 SpO2: 98 % O2 Device: None (Room air) Oxygen Delivery: 2 LPM  Weight: 175 lbs 0 oz  Gen: NAD, sitting up comfortably in bed, pleasant and cooperative with interview and exam  HEENT: normocephalic, no scleral icterus, no perioral lesions, oral mucosa moist  CV: RRR at time of exam  Pulm: good air movement bilaterally without wheezing  Abd: soft, +bs, nontender to light palpation diffusely, nondistended  LE: no edema  bilaterally  Skin: incisions from lap denise well approximated and without e/e/e  Neuro: AOx3, no tremor, gait stable  Psych: mood-affect congruent         Primary Care Physician   Physician No Ref-Primary    Discharge Orders      Discharge Instructions    May start regular diet immediately.    No lifting >10-20 pounds for 4 weeks.  No rigorous physical activity.    May shower now. No bathing for two weeks. You have dermabond (skin glue) over your incision. This will fall off with time.       Call [phone] to schedule or with questions during the week days.      Call [phone] and ask to speak with surgery resident if you are having troubles in the evenings, at night, or on weekends. Please call if you experience increasing abdominal pain, nausea, vomiting, increasing drainage from your wounds, chills, or fever >101.5    Take stool softener while taking narcotic pain medication.    No driving for at least 12 hours after taking narcotic pain medication.     Reason for your hospital stay    You were admitted with symptoms, labs and imaging showing gallstones with dilation of the drainage system of the gallbladder. You had an ERCP (procedure to remove a gallstone stuck in the drainage system of the gallbladder) and a laparascopic cholecystectomy (surgery to remove your gallbladder).     Activity    Your activity upon discharge: activity as tolerated and no driving while taking opiate medication     Follow Up and recommended labs and tests    Please follow up with your primary care provider at [place] about a week after surgery. A clinic nurse from general surgery will call you in the next few days to check in after surgery. Please feel free to call our clinic if you'd like to be see in person for follow up. Clinic number: [phone]     Diet    Follow this diet upon discharge: Orders Placed This Encounter      Advance Diet as Tolerated: Regular Diet Adult; Regular Diet Adult       Significant Results and  Procedures   Most Recent 3 CBC's:Recent Labs   Lab Test 01/07/23  0729 01/06/23  0731 01/05/23  0653   WBC 9.2 8.6 7.7   HGB 13.1 13.5 13.8   MCV 92 90 91    317 306     Most Recent 3 BMP's:Recent Labs   Lab Test 01/07/23  0729 01/06/23  0731 01/05/23  1244 01/05/23  0653    139  --  139   POTASSIUM 3.6 4.0  --  4.8   CHLORIDE 104 104  --  106   CO2 22 22  --  21*   BUN 9.2 9.0  --  9.2   CR 0.74 0.70  --  0.73   ANIONGAP 13 13  --  12   ANDREW 8.9 9.5  --  9.3   GLC 99 106* 100* 106*       Discharge Medications   Current Discharge Medication List      START taking these medications    Details   acetaminophen (TYLENOL) 325 MG tablet Take 2 tablets (650 mg) by mouth every 4 hours as needed for mild pain or fever  Qty: 12 tablet, Refills: 0    Associated Diagnoses: Status post laparoscopic cholecystectomy      oxyCODONE (ROXICODONE) 5 MG tablet Take 1 tablet (5 mg) by mouth every 4 hours as needed for moderate pain (4-6)  Qty: 12 tablet, Refills: 0    Associated Diagnoses: Choledocholithiasis         CONTINUE these medications which have NOT CHANGED    Details   escitalopram (LEXAPRO) 10 MG tablet Take 5-20 mg by mouth daily Take 1/2 tablet daily x5 days, 1 tablet daily x5 days, 1.5 tablets daily x5 days then 2 tablets daily - RX written 12/27/22      ESTRADIOL VALERATE IM Inject 4 mg into the muscle once a week On Thursdays      spironolactone (ALDACTONE) 100 MG tablet Take 125 mg by mouth daily           Allergies   No Known Allergies

## 2023-01-07 NOTE — PROGRESS NOTES
Patient discharged home following hospital stay for: Choledocholithiasis    Vitals stable.  Oxygen status: on room air  Patient tolerating diet: regular    Belongings sent with patient. IV site discontinued. Discharge meds to discharge pharmacy. AVS and education gone over with patient, signed copy in patient chart. Pt to follow up as outpatient and with PCP. Pt verbalized understanding of all education and information.

## 2023-01-07 NOTE — PROGRESS NOTES
Observation Goals    ERCP: Met. ERCP yesterday    Cholecystectomy: Met. Completed today.  Pain control: Progressing. Pain is adequately managed with oral meds.

## 2023-01-07 NOTE — PLAN OF CARE
Goal Outcome Evaluation:      Plan of Care Reviewed With: patient    Overall Patient Progress: improving    Time: 1930-0730  Status:POD #1 s/p LAPAROSCOPIC CHOLECYSTECTOMY  Neuros: A&Ox4, cooperative.   Cardiac: WNL, denies cardiac chest pain.   Respiratory: WNL on RA. Denies SOB. IS encouraged.   Pain: reports incisional pain 3-4/10, prn tylenol x1 with relief.   Nausea: Denies N/V.  Diet: Regular  GI/: Voiding spontaneously. Faint BS, reports minimal flatus  Skin/incisions: abdominal lap site x4 with liquid bandage, REMI.  Lines: PIV x2 SL.   Labs: Reviewed  Activity: Up ad tiffany, independently.   New Changes this Shift: None   Plan: possible discharge home. Continue POC.     Observation Goals   ERCP: Met. ERCP yesterday    Cholecystectomy: Met. Completed today.  Pain control: Progressing. Pain is adequately managed with oral meds.

## 2023-01-07 NOTE — PROGRESS NOTES
Observation Goals  ERCP: Met   Cholecystectomy: Met. Completed today.  pain control: progressing. Current rated pain 3/10

## 2023-01-09 ENCOUNTER — PATIENT OUTREACH (OUTPATIENT)
Dept: CARE COORDINATION | Facility: CLINIC | Age: 18
End: 2023-01-09

## 2023-01-09 ENCOUNTER — PATIENT OUTREACH (OUTPATIENT)
Dept: SURGERY | Facility: CLINIC | Age: 18
End: 2023-01-09

## 2023-01-09 NOTE — PROGRESS NOTES
RN Post-Op/Post-Discharge Care Coordination Note    Ms. Duyen Jones is a 17 year old adult who underwent laparoscopic cholecystectomy on 1/6 with  Dr. Can Huffman. Spoke with Patient.    Support  Patient able to care for self independently     Health Status  Nausea/Vomiting: Patient denies nausea/vomiting.  Eating/drinking: Patient is able to eat and drink without any complaints.  Bowel habits: Patient reports no bowel movement since surgery. Is passing gas. Advised to try OTC Miralax and Senna per package instructions. Also advised on increasing fluid intake.   Fevers/chills: Patient denies any fever or chills.  Incisions: Patient denies any signs and symptoms of infection. Wound closure:  Skin Sealant  Pain: tolerable, using Tylenol and oxycodone per package instructions. Also advised on use of ice and/or heat for 20min intervals protecting skin from injury.  New Medications: Tylenol, oxycodone    Activity/Restrictions  No lifting in excess of 15-20 pounds for 3-4 weeks    Equipment  None    Pathology reviewed with patient:  No, not yet available    Forms/Letters  No    All of her questions were answered including reviewing restrictions and wound care.  She will call this office if she has any further questions and/or concerns.      In lieu of a post-op clinic patient that patient would like to be contacted in approximately 7-10 days via telephone. Message sent to GI team regarding KUB in 4 weeks. Patient is aware she will need to do this.    A copy of this note was routed to the primary surgeon.      Whom and When to Call  Patient acknowledges understanding of how to manage any medication changes and when to seek medical care.     Patient advised that if after hour medical concerns arise to please call [phone] and choose option 4 to speak to the physician on call.

## 2023-01-09 NOTE — PROGRESS NOTES
[facility]    Background: Transitional Care Management program identified per system criteria and reviewed by [facility] team for possible outreach.    Assessment: Upon chart review, [facility] Team member will not proceed with patient outreach related to this episode of Transitional Care Management program due to reason below:    Patient has active communication with a nurse, provider or care team for reason of post-hospital follow up plan.  Outreach call by [facility] team not indicated to minimize duplicative efforts.      Patient was contacted today by a Registered Nurse from [facility] [place] for Post-Op/Post-Discharge Care Coordination. RN discussed the following with patient today for [facility]: support, health status, activity/restrictions, equipment, forms/letters, and whom and when to call. No W outreach call needed at this time.     Plan: Transitional Care Management episode addressed appropriately per reason noted above.      ANNABELLA Ridley  [phone]  [facility]    *Connected Care Resource Team does NOT follow patient ongoing. Referrals are identified based on internal discharge reports and the outreach is to ensure patient has an understanding of their discharge instructions.

## 2023-01-12 LAB
PATH REPORT.COMMENTS IMP SPEC: NORMAL
PATH REPORT.COMMENTS IMP SPEC: NORMAL
PATH REPORT.FINAL DX SPEC: NORMAL
PATH REPORT.GROSS SPEC: NORMAL
PATH REPORT.MICROSCOPIC SPEC OTHER STN: NORMAL
PATH REPORT.RELEVANT HX SPEC: NORMAL
PHOTO IMAGE: NORMAL

## 2023-01-13 ENCOUNTER — PATIENT OUTREACH (OUTPATIENT)
Dept: SURGERY | Facility: CLINIC | Age: 18
End: 2023-01-13

## 2023-01-13 NOTE — RESULT ENCOUNTER NOTE
Results came to hospitalist pool. Routing to surgeon as FYI as patient has no primary care doctor listed. No action required.

## 2023-01-13 NOTE — PROGRESS NOTES
Patient calling into the General Surgery nurse triage line questioning whether or not she can bend over after having lap cholecystectomy. 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 Advised that there are no restrictions on this activity but to limit bending and twisting as much as possible, especially if it is causing increased pain.     Patient is also wondering if she has any dietary restrictions after surgery. Advised that she can go back to a regular diet but may want to avoid anything greasy/fatty for a few weeks as there is a risk of stomach upset or diarrhea in some patients after having their gallbladder removed. She verbalizes understanding of instruction. All questions answered.

## 2023-01-17 ENCOUNTER — PATIENT OUTREACH (OUTPATIENT)
Dept: SURGERY | Facility: CLINIC | Age: 18
End: 2023-01-17

## 2023-01-17 NOTE — PROGRESS NOTES
Duyen Jones was contacted several days post procedure via telephone for a status update and elected to have a telephone follow-up call approximately 7-10 days after original contact in lieu of a clinic visit with Dr. Can Huffman. She continues to do well and the skin glue has not peeled off. The patients wounds are healed and the area is C/D/I. She is afebrile, pain free, and yael po; the patient is slowly resuming her normal activity. Discussed restrictions including no lifting in excess of 15 pounds for 2 more weeks.    Pathology was reviewed with the patient: Yes   Final Diagnosis   GALLBLADDER; CHOLECYSTECTOMY:  Cholelithiases with chronic cholecystitis      All of Duyen Jones question's were answered and  she would like to return to the clinic on a PRN basis.  The patient is aware that  she may schedule a post op appointment at any time.    A copy of this note was routed to the patients surgeon.

## 2023-03-23 ENCOUNTER — TRANSFERRED RECORDS (OUTPATIENT)
Dept: HEALTH INFORMATION MANAGEMENT | Facility: CLINIC | Age: 18
End: 2023-03-23
Payer: COMMERCIAL

## 2023-03-23 ENCOUNTER — MEDICAL CORRESPONDENCE (OUTPATIENT)
Dept: HEALTH INFORMATION MANAGEMENT | Facility: CLINIC | Age: 18
End: 2023-03-23
Payer: COMMERCIAL

## 2023-03-27 ENCOUNTER — TRANSFERRED RECORDS (OUTPATIENT)
Dept: HEALTH INFORMATION MANAGEMENT | Facility: CLINIC | Age: 18
End: 2023-03-27
Payer: COMMERCIAL

## 2023-03-28 ENCOUNTER — TRANSCRIBE ORDERS (OUTPATIENT)
Dept: OTHER | Age: 18
End: 2023-03-28

## 2023-03-28 DIAGNOSIS — F64.9 GENDER DYSPHORIA: Primary | ICD-10-CM

## 2023-03-30 ENCOUNTER — TELEPHONE (OUTPATIENT)
Dept: PLASTIC SURGERY | Facility: CLINIC | Age: 18
End: 2023-03-30
Payer: COMMERCIAL

## 2023-03-30 NOTE — CONFIDENTIAL NOTE
Writer discussed orchiectomy and LOS with pt. Pt is unsure about vaginoplasty and wanting to pursue orchiectomy only at this time, but will likely have questions at her consult. Pt reports having a psychiatrist who could write LOS but not a therapist. Writer to email  providers, [facility] LOS requirements, and cost of care line for questions about cost of orchiectomy.

## 2024-02-14 ENCOUNTER — HOSPITAL ENCOUNTER (EMERGENCY)
Facility: CLINIC | Age: 19
Discharge: HOME | End: 2024-02-14
Admitting: PHYSICIAN ASSISTANT
Payer: COMMERCIAL

## 2024-02-14 VITALS
RESPIRATION RATE: 16 BRPM | DIASTOLIC BLOOD PRESSURE: 57 MMHG | HEART RATE: 64 BPM | SYSTOLIC BLOOD PRESSURE: 110 MMHG | WEIGHT: 186 LBS | OXYGEN SATURATION: 97 % | TEMPERATURE: 97.6 F

## 2024-02-14 DIAGNOSIS — T31.0 BURN ANY DEGREE INVOLVING LESS THAN 10 PERCENT OF BODY SURFACE: ICD-10-CM

## 2024-02-14 DIAGNOSIS — T23.271A PARTIAL THICKNESS BURN OF RIGHT WRIST, INITIAL ENCOUNTER: Primary | ICD-10-CM

## 2024-02-14 DIAGNOSIS — T23.209A SUPERFICIAL PARTIAL THICKNESS BURN OF HAND: ICD-10-CM

## 2024-02-14 PROCEDURE — 99283 EMERGENCY DEPT VISIT LOW MDM: CPT | Performed by: PHYSICIAN ASSISTANT

## 2024-02-14 NOTE — Clinical Note
Sho Jones was seen and treated in our emergency department on 2/14/2024.  Sho Jones may return to work on 02/15/2024.  Patient may return to work 2/15/2024 with restriction for the next 1 week of keeping the wound clean and covered, no repetitive movements of the right wrist and no heavy lifting more than 5 pounds for the next 1 week.     If you have any questions or concerns, please don't hesitate to call.      Martha Lira PA-C

## 2024-02-14 NOTE — ED PROVIDER NOTES
ED Provider Note  [facility]      History     Chief Complaint   Patient presents with    Burn, Extremity     HPI  Sho Jones is a 18 year old adult right-hand-dominant who presents to the emergency room tonight with concerns for thermal burn of the right wrist.  Patient works at [employer], just prior to presentation about 3 hours ago was cooking with a fryer in the back, subsequently burned their right wrist.  They immediately applied cold water to the area and are now having pain to the area.  Patient denies any numbness or ting of the right hand.  They deny any other chronic medical problems.  They are unsure of their tetanus status although did attend public schools where their tetanus would have been up-to-date.  No other concerns.  No other injury.      A medically appropriate review of systems was performed with pertinent positives and negatives noted in the HPI, and all other systems negative.    Physical Exam   BP: 108/67  Pulse: 68  Temp: 98  F (36.7  C)  Resp: 14  Weight: 84.4 kg (186 lb)  SpO2: 98 %  Physical Exam    GENERAL APPEARANCE: The patient is well developed, well appearing, and in no acute distress.  HEAD:  Normocephalic and atraumatic.   EENT: Voice normal.  NECK: Trachea is midline.  EXTREMITIES: No cyanosis, clubbing, or edema.  NEUROLOGIC: No focal sensory or motor deficits are noted.  PSYCHIATRIC: The patient is awake, alert.  Appropriate mood and affect.  SKIN: Exam of the right upper extremity shows approximately 1 cm in diameter superficial partial-thickness burn to the radial aspect of the right wrist.  There is small vesicle noted which is not open.  No other findings suggestive of laureth other thermal injury.  No bleeding.  Patient has intact radial pulse on the right side 2+.  See Derm photo below.              ED Course, Procedures, & Data         No results found for any visits on 02/14/24.  Medications - No data to display  Labs Ordered and  Resulted from Time of ED Arrival to Time of ED Departure - No data to display  No orders to display          Critical care was not performed.     Medical Decision Making  The patient's presentation was of low complexity (an acute and uncomplicated illness or injury).    The patient's evaluation involved:  history and exam without other MDM data elements    The patient's management necessitated only low risk treatment.    Assessment & Plan    This is a well-appearing 18-year-old adult presenting with concerns for thermal burn to the right wrist while at work.  On presentation vital signs within normal limits.  On exam patient has findings consistent with small superficial partial-thickness burn to the right radial aspect of the wrist without deeper involvement.  There is a vesicle which has not yet open.  At this time discussed with patient recommendations for wound care including bacitracin ointment nonstick dressing and taking easy with the wrist for the next 1 week.  We discussed how this may scar.  We discussed infection indications.  Patient did attend public school where their tetanus would have been up-to-date.  We discussed return precautions.  Work note given.  Patient has no other questions or concerns at this time.  Red flag signs were addressed, and they were in agreement with the patient care plan provided.    I have reviewed the nursing notes. I have reviewed the findings, diagnosis, plan and need for follow up with the patient.    New Prescriptions    No medications on file       Final diagnoses:   Superficial partial thickness burn of hand - Right wrist       KING Palencia  [facility] EMERGENCY DEPARTMENT  2/14/2024

## 2024-02-14 NOTE — DISCHARGE INSTRUCTIONS
Here in the emergency room you have reassuring evaluation.  We discussed you have a superficial partial-thickness burn of your right wrist.  This will heal well.  Recommend keeping the area clean, with bacitracin or Neosporin ointment and a nonstick dressing.  We discussed how this may result in a scar.  You can use Tylenol B Profen as needed.  We discussed returning if you develop any findings for infection.  Work note given.  If you develop any new or worsening symptoms, is important to return right away to the emergency department for further evaluation and management.

## 2024-04-19 ENCOUNTER — TRANSCRIBE ORDERS (OUTPATIENT)
Dept: OTHER | Age: 19
End: 2024-04-19

## 2024-04-19 DIAGNOSIS — F64.9 GENDER INCONGRUENCE: Primary | ICD-10-CM

## 2024-04-19 DIAGNOSIS — Z79.899 MEDICATION MANAGEMENT: ICD-10-CM

## 2024-04-30 ENCOUNTER — TELEPHONE (OUTPATIENT)
Dept: PLASTIC SURGERY | Facility: CLINIC | Age: 19
End: 2024-04-30
Payer: COMMERCIAL

## 2024-04-30 NOTE — CONFIDENTIAL NOTE
[facility] :  Care Coordination Note     SITUATION   Duyen Jones (she/her) is a 19 year old adult who is receiving support for:  Care Team  .    BACKGROUND     Pt is scheduled for a vaginoplasty/orchiectomy consult with Mei Ramon on 5/13/24.     Pt is weighing her options on procedure and wants to discuss.     ASSESSMENT     Surgery              CGC Assessment  [employer] [employer] [facility] ([facility]) Enrollment: (P) Enrolled  Patient has a therapist: (P) Yes  Letter of support #1: (P) Requested  Letter of support #2: (P) Requested  Surgery being considered: (P) Yes  Vaginoplasty: (P) Yes    Pt reports:   No nicotine or other gender affirming surgeries  HRT 2 years about    PLAN          Nursing Interventions:       Follow-up plan:         Sultana Curtis

## 2024-05-01 NOTE — TELEPHONE ENCOUNTER
FUTURE VISIT INFORMATION      FUTURE VISIT INFORMATION:  Date: 5/13/24  Time: 1:00pm  [place] [facility] [place] INFORMATION:  Referring provider:  Sandy Bose APRN CNP   Referring providers clinic:  [facility]   Reason for visit/diagnosis  vaginoplasty vs. orchiectomy     RECORDS REQUESTED FROM:       No recs to collect

## 2024-05-13 ENCOUNTER — OFFICE VISIT (OUTPATIENT)
Dept: PLASTIC SURGERY | Facility: CLINIC | Age: 19
End: 2024-05-13
Payer: COMMERCIAL

## 2024-05-13 ENCOUNTER — PRE VISIT (OUTPATIENT)
Dept: PLASTIC SURGERY | Facility: CLINIC | Age: 19
End: 2024-05-13

## 2024-05-13 VITALS
BODY MASS INDEX: 26.67 KG/M2 | SYSTOLIC BLOOD PRESSURE: 124 MMHG | WEIGHT: 186.3 LBS | DIASTOLIC BLOOD PRESSURE: 77 MMHG | HEIGHT: 70 IN | OXYGEN SATURATION: 97 % | HEART RATE: 70 BPM

## 2024-05-13 DIAGNOSIS — F64.9 GENDER INCONGRUENCE: Primary | ICD-10-CM

## 2024-05-13 DIAGNOSIS — Z79.899 MEDICATION MANAGEMENT: ICD-10-CM

## 2024-05-13 DIAGNOSIS — F64.0 GENDER DYSPHORIA IN ADULT: ICD-10-CM

## 2024-05-13 PROCEDURE — 99204 OFFICE O/P NEW MOD 45 MIN: CPT | Performed by: NURSE PRACTITIONER

## 2024-05-13 RX ORDER — PROGESTERONE 200 MG/1
200 CAPSULE ORAL DAILY
COMMUNITY

## 2024-05-13 RX ORDER — SERTRALINE HYDROCHLORIDE 100 MG/1
100 TABLET, FILM COATED ORAL DAILY
COMMUNITY

## 2024-05-13 ASSESSMENT — PAIN SCALES - GENERAL: PAINLEVEL: NO PAIN (0)

## 2024-05-13 NOTE — NURSING NOTE
"Chief Complaint   Patient presents with    Consult     Vaginoplasty vs. Orchiectomy.       Vitals:    05/13/24 1258   BP: 124/77   BP Location: Right arm   Patient Position: Sitting   Cuff Size: Adult Regular   Pulse: 70   SpO2: 97%   Weight: 84.5 kg (186 lb 4.8 oz)   Height: 1.778 m (5' 10\")       Body mass index is 26.73 kg/m .    Florentin Alarcon, EMT    "

## 2024-05-13 NOTE — LETTER
"5/13/2024       RE: Duyen Jones  [address]  [facility] [address]     Dear Colleague,    Thank you for referring your patient, Duyen Jones, to the [facility] [place] at [facility]. Please see a copy of my visit note below.        Name: \"Sho\"Maria Luisa Jones     MRN: [id number]   YOB: 2005                 Chief Complaint:   Gender Dysphoria            History of Present Illness:   Sho is a 19 year old transgender female seen in consultation for gender dysphoria    Patient transitioned starting in early 2021 and started in spring 2021.  Preferred pronouns are: she/her/hers  The patient has been on exogenous hormones since: May 21, 2021.  In terms of an intimate relationship and support system the patient has a significant other. Sister in [place] and Mom in [place]. She currently lives with roommates and boyfriend. Family and friends supportive.  In terms of fertility, the patient: has no interest in sperm banking    (New)  The patient has not yet obtained letters of support from providers, but she has a therapist and HRT provider willing to write letters.     (Prior Surgery)  The patient has previously undergone no gender surgeries.     Long-term surgical goals for the patient include: orchiectomy for sure, undecided about vaginoplasty. Has some interest in FFS.     The patient is here today expressing interest in orchiectomy with possible vaginoplasty.    The patient has not begun hair removal.         Past Medical History:     Past Medical History:   Diagnosis Date    Anxiety    Gender dysphoria         Past Surgical History:     Past Surgical History:   Procedure Laterality Date    ENDOSCOPIC RETROGRADE CHOLANGIOPANCREATOGRAM N/A 1/5/2023    Procedure: ENDOSCOPIC RETROGRADE CHOLANGIOPANCREATOGRAPHY WITH BILIARY SPHINCTEROTOMY AND STENT PLACEMENT; " Patient would like communication of their results via:     Home Phone: [phone] (home)  Okay to leave a message containing results? Yes    Medications verified and reviewed. Allergies verified and reviewed, including latex. Tobacco history verified 7/7/2022. PCP verified or updated.  Flo Watkins MD " Surgeon: Jewel Almanza MD;  Location: [facility] OR    LAPAROSCOPIC APPENDECTOMY  2019    LAPAROSCOPIC CHOLECYSTECTOMY N/A 1/6/2023    Procedure: CHOLECYSTECTOMY, LAPAROSCOPIC;  Surgeon: Jose Huffman MD;  Location:  OR            Social History:     Social History     Tobacco Use    Smoking status: Never    Smokeless tobacco: Never   Substance Use Topics    Alcohol use: Yes     Alcohol/week: 1.0 standard drink of alcohol     Types: 1 Standard drinks or equivalent per week            Family History:     Family History   Problem Relation Age of Onset    Cholelithiasis No family hx of     Cholecystitis No family hx of               Allergies:   No Known Allergies         Medications:     Current Outpatient Medications   Medication Sig Dispense Refill    acetaminophen (TYLENOL) 325 MG tablet Take 2 tablets (650 mg) by mouth every 4 hours as needed for mild pain or fever 12 tablet 0    ESTRADIOL VALERATE IM Inject 4 mg into the muscle once a week On Thursdays      progesterone (PROMETRIUM) 200 MG capsule Take 200 mg by mouth daily      sertraline (ZOLOFT) 100 MG tablet Take 100 mg by mouth daily      spironolactone (ALDACTONE) 100 MG tablet Take 125 mg by mouth daily      escitalopram (LEXAPRO) 10 MG tablet Take 5-20 mg by mouth daily Take 1/2 tablet daily x5 days, 1 tablet daily x5 days, 1.5 tablets daily x5 days then 2 tablets daily - RX written 12/27/22 (Patient not taking: Reported on 5/13/2024)      oxyCODONE (ROXICODONE) 5 MG tablet Take 1 tablet (5 mg) by mouth every 4 hours as needed for moderate pain (4-6) (Patient not taking: Reported on 5/13/2024) 12 tablet 0     No current facility-administered medications for this visit.             Review of Systems:    ROS: ROS neg other than the symptoms noted above in the HPI.          Physical Exam:   /77 (BP Location: Right arm, Patient Position: Sitting, Cuff Size: Adult Regular)   Pulse 70   Ht 1.778 m (5' 10\")   Wt 84.5 kg (186 lb 4.8 oz)  "  SpO2 97%   BMI 26.73 kg/m    General: age-appropriate in NAD  HEENT: Head AT/NC, EOMI, CN Grossly intact  Resp: no respiratory distress  : circumcised penis without lesions or discharge; scrotum with bilateral testicles present  Neuro: grossly intact  Skin: clear of rashes or ecchymoses.        Outside records:    I spent 10 minutes reviewing outside records.         Assessment and Plan:   19 year old transgender female with gender dysphoria    The criteria for genital surgery are specific to the type of surgery being requested.  Criteria for bottom surgery:    1. Persistent, well documented gender dysphoria;  2. Capacity to make a fully informed decision and to consent for treatment;  3. Age of consent (>18 years old)  4. If significant medical or mental health concerns are present, they must be well controlled.  5. 12 continuous months of hormone therapy as appropriate to the patient s gender goals (unless  the patient has a medical contraindication or is otherwise unable or unwilling to take  Hormones).  6. Two letters of support    The aim of hormone therapy prior to gonadectomy is primarily to introduce a period of reversible  estrogen or testosterone suppression, before the patient undergoes irreversible surgical intervention.    I reviewed the steps of orchiectomy. I reviewed the surgical procedure. I reviewed the risks and benefits including bleeding, infection and irreversible nature of the procedure. The patient would like orchiectomy for sure, as soon as possible.    Hair removal is a requirement prior to full depth vaginoplasty as the genital skin will be placed in the vaginal cavity. Lack of hair removal would lead to complications related to intravaginal hair. This is nearly impossible to remove postoperatively.    She has a persistent, well documented gender dysphoria. She has capacity to make a fully informed decision and to consent for treatment. Her mental health issues are well controlled.  She has been on continuous hormones for years. She will obtain 2 letters of support and DA.     The patient meets all of these criteria. We discussed that gender affirmation surgery should be considered permanent. We discussed risks/complications of rectal injury, rectovaginal fistula, bleeding, fluid collection, infection, injury to surrounding structures, flap loss, sensory loss, wound dehiscence, vaginal prolapse, vaginal shrinkage/stenosis, need for lifelong dilation, urinary stream abnormalities, DVT/PE and need for revision surgery.     We discussed the option for minimal depth and full depth. She would like orchiectomy now, and possible full depth vaginoplasty in th future      Would need hair removal for vaginoplasty but not for orchiectomy    Needs LOS and DA before prior auth for orchiectomy      Plan: Patient will obtain LOS and DA for orchiectomy and email to our team for review. Then ready for PA    45 minutes spent on day of encounter doing chart review, history and exam, consultation and education, and additional activities as including in note above.          Again, thank you for allowing me to participate in the care of your patient.      Sincerely,    SHE Curran CNP

## 2024-05-13 NOTE — PROGRESS NOTES
"    Name: \"Sho\" Maria Luisa Jones     MRN: [id number]   YOB: 2005                 Chief Complaint:   Gender Dysphoria            History of Present Illness:   Sho is a 19 year old transgender female seen in consultation for gender dysphoria    Patient transitioned starting in early 2021 and started in spring 2021.  Preferred pronouns are: she/her/hers  The patient has been on exogenous hormones since: May 21, 2021.  In terms of an intimate relationship and support system the patient has a significant other. Sister in [place] and Mom in [place]. She currently lives with roommates and boyfriend. Family and friends supportive.  In terms of fertility, the patient: has no interest in sperm banking    (New)  The patient has not yet obtained letters of support from providers, but she has a therapist and HRT provider willing to write letters.     (Prior Surgery)  The patient has previously undergone no gender surgeries.     Long-term surgical goals for the patient include: orchiectomy for sure, undecided about vaginoplasty. Has some interest in FFS.     The patient is here today expressing interest in orchiectomy with possible vaginoplasty.    The patient has not begun hair removal.         Past Medical History:     Past Medical History:   Diagnosis Date    Anxiety    Gender dysphoria         Past Surgical History:     Past Surgical History:   Procedure Laterality Date    ENDOSCOPIC RETROGRADE CHOLANGIOPANCREATOGRAM N/A 1/5/2023    Procedure: ENDOSCOPIC RETROGRADE CHOLANGIOPANCREATOGRAPHY WITH BILIARY SPHINCTEROTOMY AND STENT PLACEMENT;  Surgeon: Jewel Almanza MD;  Location:  OR    LAPAROSCOPIC APPENDECTOMY  2019    LAPAROSCOPIC CHOLECYSTECTOMY N/A 1/6/2023    Procedure: CHOLECYSTECTOMY, LAPAROSCOPIC;  Surgeon: Jose Huffman MD;  Location:  OR            Social History:     Social History     Tobacco Use    Smoking status: Never    Smokeless tobacco: Never " "  Substance Use Topics    Alcohol use: Yes     Alcohol/week: 1.0 standard drink of alcohol     Types: 1 Standard drinks or equivalent per week            Family History:     Family History   Problem Relation Age of Onset    Cholelithiasis No family hx of     Cholecystitis No family hx of               Allergies:   No Known Allergies         Medications:     Current Outpatient Medications   Medication Sig Dispense Refill    acetaminophen (TYLENOL) 325 MG tablet Take 2 tablets (650 mg) by mouth every 4 hours as needed for mild pain or fever 12 tablet 0    ESTRADIOL VALERATE IM Inject 4 mg into the muscle once a week On Thursdays      progesterone (PROMETRIUM) 200 MG capsule Take 200 mg by mouth daily      sertraline (ZOLOFT) 100 MG tablet Take 100 mg by mouth daily      spironolactone (ALDACTONE) 100 MG tablet Take 125 mg by mouth daily      escitalopram (LEXAPRO) 10 MG tablet Take 5-20 mg by mouth daily Take 1/2 tablet daily x5 days, 1 tablet daily x5 days, 1.5 tablets daily x5 days then 2 tablets daily - RX written 12/27/22 (Patient not taking: Reported on 5/13/2024)      oxyCODONE (ROXICODONE) 5 MG tablet Take 1 tablet (5 mg) by mouth every 4 hours as needed for moderate pain (4-6) (Patient not taking: Reported on 5/13/2024) 12 tablet 0     No current facility-administered medications for this visit.             Review of Systems:    ROS: ROS neg other than the symptoms noted above in the HPI.          Physical Exam:   /77 (BP Location: Right arm, Patient Position: Sitting, Cuff Size: Adult Regular)   Pulse 70   Ht 1.778 m (5' 10\")   Wt 84.5 kg (186 lb 4.8 oz)   SpO2 97%   BMI 26.73 kg/m    General: age-appropriate in NAD  HEENT: Head AT/NC, EOMI, CN Grossly intact  Resp: no respiratory distress  : circumcised penis without lesions or discharge; scrotum with bilateral testicles present  Neuro: grossly intact  Skin: clear of rashes or ecchymoses.        Outside records:    I spent 10 minutes reviewing " outside records.         Assessment and Plan:   19 year old transgender female with gender dysphoria    The criteria for genital surgery are specific to the type of surgery being requested.  Criteria for bottom surgery:    1. Persistent, well documented gender dysphoria;  2. Capacity to make a fully informed decision and to consent for treatment;  3. Age of consent (>18 years old)  4. If significant medical or mental health concerns are present, they must be well controlled.  5. 12 continuous months of hormone therapy as appropriate to the patient s gender goals (unless  the patient has a medical contraindication or is otherwise unable or unwilling to take  Hormones).  6. Two letters of support    The aim of hormone therapy prior to gonadectomy is primarily to introduce a period of reversible  estrogen or testosterone suppression, before the patient undergoes irreversible surgical intervention.    I reviewed the steps of orchiectomy. I reviewed the surgical procedure. I reviewed the risks and benefits including bleeding, infection and irreversible nature of the procedure. The patient would like orchiectomy for sure, as soon as possible.    Hair removal is a requirement prior to full depth vaginoplasty as the genital skin will be placed in the vaginal cavity. Lack of hair removal would lead to complications related to intravaginal hair. This is nearly impossible to remove postoperatively.    She has a persistent, well documented gender dysphoria. She has capacity to make a fully informed decision and to consent for treatment. Her mental health issues are well controlled. She has been on continuous hormones for years. She will obtain 2 letters of support and DA.     The patient meets all of these criteria. We discussed that gender affirmation surgery should be considered permanent. We discussed risks/complications of rectal injury, rectovaginal fistula, bleeding, fluid collection, infection, injury to surrounding  structures, flap loss, sensory loss, wound dehiscence, vaginal prolapse, vaginal shrinkage/stenosis, need for lifelong dilation, urinary stream abnormalities, DVT/PE and need for revision surgery.     We discussed the option for minimal depth and full depth. She would like orchiectomy now, and possible full depth vaginoplasty in th future      Would need hair removal for vaginoplasty but not for orchiectomy    Needs LOS and DA before prior auth for orchiectomy      Plan: Patient will obtain LOS and DA for orchiectomy and email to our team for review. Then ready for PA      SHE Lockhart, CNP  [facility]    45 minutes spent on day of encounter doing chart review, history and exam, consultation and education, and additional activities as including in note above.

## 2024-05-13 NOTE — LETTER
[facility] [address]  [address] [facility] [id number] [phone]      May 13, 2024    RE:  Duyen Jones                                                                                                                                                       [address] [facility] [address]        To whom it may concern,    This letter is written to detail the medically necessity of hair removal for Duyen as part of preoperative preparation for full depth vaginoplasty. Duyen was seen in clinic for a full depth vaginoplasty consultation on 05/13/2024. Duyen is diagnosed with gender dysphoria  (diagnosis code F64.0) and is a good candidate for full depth vaginoplasty. Hair removal from the genital region is a requirement prior to full depth vaginoplasty, as the genital skin will be placed in the vaginal cavity. Lack of hair removal would lead to complications related to intravaginal hair. Hair removal is nearly impossible to remove postoperatively. Therefore, hair removal prior to vaginoplasty is medically necessary. Hair removal options include Laser Hair Removal (CPT 76095) and/or Electrolysis (CPT 78851).       Sincerely,         SHE Lockhart, CNP  Comprehensive Gender Care Program  [facility]

## 2024-05-13 NOTE — PATIENT INSTRUCTIONS
It was wonderful to meet you today, Duyen.  Our comprehensive gender care team is here to support you as you work toward bottom surgery and throughout your future recovery.  Please reach out with any questions or concerns.  Thank you for entrusting us with your care.

## 2024-06-30 ENCOUNTER — MEDICAL CORRESPONDENCE (OUTPATIENT)
Dept: HEALTH INFORMATION MANAGEMENT | Facility: CLINIC | Age: 19
End: 2024-06-30

## 2024-08-13 ENCOUNTER — DOCUMENTATION ONLY (OUTPATIENT)
Dept: PLASTIC SURGERY | Facility: CLINIC | Age: 19
End: 2024-08-13
Payer: COMMERCIAL

## 2024-08-13 NOTE — PROGRESS NOTES
[facility] :  Care Coordination Note     SITUATION   Sho Jones (she/her) is a 19 year old adult who is receiving support for:  Care Team (LOS Review)  .    BACKGROUND   One LOS meets criteria for Orchi (located in writers email dated 8/1; faxed to HIM. Sent copy to PA coordinator and RNCC). Pt will need second Los and a DA. Writer sent an email to patient requesting additional clinicals.     9/23/2024  Second LOS received and meets criteria for Orchiectomy (media tab 9/23/24). DA received and will need updates (media tab 9/23/24)--new date and gender dysphoria diagnosis.Writer called and emailed patient requesting updates.  ASSESSMENT     Surgery              [facility] Assessment  [facility] ([facility] Enrollment: Enrolled  Patient has a therapist: Yes  Letter of support #1: Received  Letter #1 Date: 06/30/24  Letter of support #2: Received  Letter #2 Date: 08/19/24  Surgery being considered: Yes  Orchiectomy: Yes      PLAN          Nursing Interventions:       Follow-up plan:  Pt will upload updated DA.       NEO Carlton

## 2024-09-15 ENCOUNTER — HOSPITAL ENCOUNTER (EMERGENCY)
Facility: CLINIC | Age: 19
Discharge: HOME | End: 2024-09-15
Attending: EMERGENCY MEDICINE | Admitting: EMERGENCY MEDICINE

## 2024-09-15 VITALS
DIASTOLIC BLOOD PRESSURE: 75 MMHG | TEMPERATURE: 98.1 F | HEART RATE: 67 BPM | SYSTOLIC BLOOD PRESSURE: 121 MMHG | OXYGEN SATURATION: 97 %

## 2024-09-15 DIAGNOSIS — S61.209A AVULSION, FINGER TIP, INITIAL ENCOUNTER: ICD-10-CM

## 2024-09-15 PROCEDURE — 90715 TDAP VACCINE 7 YRS/> IM: CPT | Performed by: EMERGENCY MEDICINE

## 2024-09-15 PROCEDURE — 99283 EMERGENCY DEPT VISIT LOW MDM: CPT | Performed by: EMERGENCY MEDICINE

## 2024-09-15 PROCEDURE — 250N000011 HC RX IP 250 OP 636: Performed by: EMERGENCY MEDICINE

## 2024-09-15 PROCEDURE — 90471 IMMUNIZATION ADMIN: CPT | Performed by: EMERGENCY MEDICINE

## 2024-09-15 PROCEDURE — 99285 EMERGENCY DEPT VISIT HI MDM: CPT | Performed by: EMERGENCY MEDICINE

## 2024-09-15 RX ADMIN — CLOSTRIDIUM TETANI TOXOID ANTIGEN (FORMALDEHYDE INACTIVATED), CORYNEBACTERIUM DIPHTHERIAE TOXOID ANTIGEN (FORMALDEHYDE INACTIVATED), BORDETELLA PERTUSSIS TOXOID ANTIGEN (GLUTARALDEHYDE INACTIVATED), BORDETELLA PERTUSSIS FILAMENTOUS HEMAGGLUTININ ANTIGEN (FORMALDEHYDE INACTIVATED), BORDETELLA PERTUSSIS PERTACTIN ANTIGEN, AND BORDETELLA PERTUSSIS FIMBRIAE 2/3 ANTIGEN 0.5 ML: 5; 2; 2.5; 5; 3; 5 INJECTION, SUSPENSION INTRAMUSCULAR at 10:47

## 2024-09-15 ASSESSMENT — ACTIVITIES OF DAILY LIVING (ADL)
ADLS_ACUITY_SCORE: 33
ADLS_ACUITY_SCORE: 33

## 2024-09-15 ASSESSMENT — COLUMBIA-SUICIDE SEVERITY RATING SCALE - C-SSRS
6. HAVE YOU EVER DONE ANYTHING, STARTED TO DO ANYTHING, OR PREPARED TO DO ANYTHING TO END YOUR LIFE?: NO
1. IN THE PAST MONTH, HAVE YOU WISHED YOU WERE DEAD OR WISHED YOU COULD GO TO SLEEP AND NOT WAKE UP?: NO
2. HAVE YOU ACTUALLY HAD ANY THOUGHTS OF KILLING YOURSELF IN THE PAST MONTH?: NO

## 2024-09-15 NOTE — ED TRIAGE NOTES
Pt presents with a laceration to the L thumb that happened chopping something at work. It is bleeding in triage. Pressure held. L thumb nail is partially missing. Says she is up to date on tetanus shot

## 2024-09-15 NOTE — LETTER
September 15, 2024      To Whom It May Concern:      Duyen Jones was seen in our Emergency Department today, 09/15/24.  She may return to work on September 17, 2024.    Sincerely,        Tahmina Portillo MD

## 2024-09-15 NOTE — DISCHARGE INSTRUCTIONS
Please follow up with Orthopedics Clinic (phone: [phone]) in 2-3 days for further evaluation and recommendations.  A referral has been placed for you and you should be hearing from the clinic within 24 hours to schedule your appointment.  If you have not been contacted in 24 hours, please call the clinic at the above number.  If your symptoms worsen please come back to the emergency department.

## 2024-09-15 NOTE — ED PROVIDER NOTES
[place] EMERGENCY DEPARTMENT ([facility])    9/15/24       ED PROVIDER NOTE       History     Chief Complaint   Patient presents with    Laceration     HPI  Duyen Jones is a 19 year old adult who presents to the ED seeking evaluation of an left thumb laceration.  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  She has cut off the tip of her thumb as well as part of her nail.  Patient states that her tetanus is up-to-date as she received this in [place], however this cannot be seen in [facility] records.  She would like to reupdate her tetanus.      Past Medical History  Past Medical History:   Diagnosis Date    Anxiety      Past Surgical History:   Procedure Laterality Date    ENDOSCOPIC RETROGRADE CHOLANGIOPANCREATOGRAM N/A 1/5/2023    Procedure: ENDOSCOPIC RETROGRADE CHOLANGIOPANCREATOGRAPHY WITH BILIARY SPHINCTEROTOMY AND STENT PLACEMENT;  Surgeon: Jewel Almanza MD;  Location: [facility] OR    LAPAROSCOPIC APPENDECTOMY  2019    LAPAROSCOPIC CHOLECYSTECTOMY N/A 1/6/2023    Procedure: CHOLECYSTECTOMY, LAPAROSCOPIC;  Surgeon: Jose Huffman MD;  Location: [facility] OR     acetaminophen (TYLENOL) 325 MG tablet  escitalopram (LEXAPRO) 10 MG tablet  ESTRADIOL VALERATE IM  oxyCODONE (ROXICODONE) 5 MG tablet  progesterone (PROMETRIUM) 200 MG capsule  sertraline (ZOLOFT) 100 MG tablet  spironolactone (ALDACTONE) 100 MG tablet      No Known Allergies  Family History  Family History   Problem Relation Age of Onset    Cholelithiasis No family hx of     Cholecystitis No family hx of      Social History   Social History     Tobacco Use    Smoking status: Never    Smokeless tobacco: Never   Substance Use Topics    Alcohol use: Yes     Alcohol/week: 1.0 standard drink of alcohol     Types: 1 Standard drinks or equivalent per week    Drug use: Not Currently     Types: Marijuana     Comment: Prior marijuana use      A complete review of systems was  performed with pertinent positives and negatives noted in the HPI, and all other systems negative.    Physical Exam   BP: 121/75  Pulse: 67  Temp: 98.1  F (36.7  C)  SpO2: 97 %  Physical Exam  Vitals and nursing note reviewed.   Constitutional:       General: She is not in acute distress.     Appearance: Normal appearance. She is not ill-appearing, toxic-appearing or diaphoretic.   HENT:      Head: Normocephalic and atraumatic.   Eyes:      Extraocular Movements: Extraocular movements intact.      Conjunctiva/sclera: Conjunctivae normal.   Cardiovascular:      Rate and Rhythm: Normal rate.      Pulses: Normal pulses.   Pulmonary:      Effort: Pulmonary effort is normal. No respiratory distress.   Musculoskeletal:         General: Tenderness and signs of injury present. No swelling. Normal range of motion.      Cervical back: Normal range of motion and neck supple.      Comments: There is avulsion of the left thumb corner fingertip including partial nail avulsion.  Mild oozing of blood from the wound.  No foreign body in the wound.  Full range of motion, active and passive in left, first MCP and DIP joints without pain.   Skin:     General: Skin is warm.      Capillary Refill: Capillary refill takes less than 2 seconds.      Findings: No rash.      Comments: There is avulsion of the left thumb corner fingertip including partial nail avulsion.  Mild oozing of blood from the wound.  No foreign body in the wound.   Neurological:      General: No focal deficit present.      Mental Status: She is alert and oriented to person, place, and time.      Sensory: No sensory deficit.      Motor: No weakness.   Psychiatric:         Mood and Affect: Mood normal.         Behavior: Behavior normal.         Thought Content: Thought content normal.         Judgment: Judgment normal.           ED Course, Procedures, & Data      Procedures                No results found for any visits on 09/15/24.  Medications   Tdap  (tetanus-diphtheria-acell pertussis) (ADACEL) injection 0.5 mL (0.5 mLs Intramuscular $Given 9/15/24 1047)     Labs Ordered and Resulted from Time of ED Arrival to Time of ED Departure - No data to display  No orders to display          Critical care was not performed.     Medical Decision Making  The patient's presentation was of high complexity (an acute health issue posing potential threat to life or bodily function).    The patient's evaluation involved:  review of external note(s) from 1 sources (see separate area of note for details)  strong consideration of a test (see separate area of note for details) that was ultimately deferred    The patient's management necessitated high risk (a decision regarding hospitalization).    Assessment & Plan    19-year-old woman presenting with injury to left thumb while cutting food at work.  Differential diagnosis: Fingertip avulsion, nail avulsion, laceration.    After thorough history and physical exam patient appears to be in no acute distress.  She has partial loss of fingertip avulsion which includes a portion of the nail as well.  There is no laceration that can be sutured and at this time we will clean her wound thoroughly, apply Xeroform dressing on it and then afterwards tube gauze dressing.  Her tetanus will be updated and she will be referred to hand surgery clinic for follow-up.  She agrees with the plan.  She will return if her symptoms worsen.     This part of the medical record was transcribed by Arnoldo Santoro Medical Scribe, from a dictation done by Tahmina Portillo MD      I have reviewed the nursing notes. I have reviewed the findings, diagnosis, plan and need for follow up with the patient.    Discharge Medication List as of 9/15/2024 10:44 AM          Final diagnoses:   Avulsion, finger tip, initial encounter       Tahmina Portillo MD  I, Arnoldo Santoro, am serving as a trained medical scribe to document services personally performed by Tahmina Portillo MD MD  based on the provider's statements to me on September 15, 2024.  This document has been checked and approved by the attending provider.    Bandar GENAO Nikola, MD MD, was physically present and have reviewed and verified the accuracy of this note documented by Arnoldo Santoro, medical scribe.      Tahmina Portillo MD MD   [facility] located within [facility] EMERGENCY DEPARTMENT  9/15/2024     Tahmina Portillo MD  09/15/24 [id number]

## 2024-09-19 ENCOUNTER — TELEPHONE (OUTPATIENT)
Dept: ORTHOPEDICS | Facility: CLINIC | Age: 19
End: 2024-09-19
Payer: COMMERCIAL

## 2024-09-19 NOTE — TELEPHONE ENCOUNTER
Patient Contacted and will call back to schedule the following:    Appointment type: New hand  Provider: Dr. Hirsch  Return date: 9/24/24 at 8:00am piotr Shaffer

## 2024-09-23 ENCOUNTER — TELEPHONE (OUTPATIENT)
Dept: ORTHOPEDICS | Facility: CLINIC | Age: 19
End: 2024-09-23
Payer: COMMERCIAL

## 2024-09-23 NOTE — TELEPHONE ENCOUNTER
Patient confirmed scheduled appointment:  Date: 9/24/24  Time:   Visit type: NEW HAD/WRIST  Provider:   Location: [facility]

## 2024-09-23 NOTE — TELEPHONE ENCOUNTER
DIAGNOSIS: Left thumb Laceration DOI: 9/15/24    APPOINTMENT DATE: 9/24/24   NOTES STATUS DETAILS   DISCHARGE REPORT from the ER Internal 9/15/24 - [facility] ED    MEDICATION LIST Internal

## 2024-09-24 ENCOUNTER — PRE VISIT (OUTPATIENT)
Dept: ORTHOPEDICS | Facility: CLINIC | Age: 19
End: 2024-09-24

## 2024-09-24 ENCOUNTER — OFFICE VISIT (OUTPATIENT)
Dept: ORTHOPEDICS | Facility: CLINIC | Age: 19
End: 2024-09-24
Payer: COMMERCIAL

## 2024-09-24 DIAGNOSIS — S61.002A OPEN WOUND OF LEFT THUMB, INITIAL ENCOUNTER: Primary | ICD-10-CM

## 2024-09-24 PROCEDURE — 99204 OFFICE O/P NEW MOD 45 MIN: CPT | Performed by: STUDENT IN AN ORGANIZED HEALTH CARE EDUCATION/TRAINING PROGRAM

## 2024-09-24 NOTE — NURSING NOTE
Reason For Visit:   Chief Complaint   Patient presents with    Consult     Left thumb laceration   DOI: 9/15/24       Primary MD: No Ref-Primary, Physician  Ref. MD: ED    Age: 19 year old    ?  No      There were no vitals taken for this visit.      Pain Assessment  Patient Currently in Pain: Yes  0-10 Pain Scale: 1  Primary Pain Location: Finger (Comment which one) (Left thumb)  Pain Descriptors: Intermittent, Throbbing    Hand Dominance Evaluation  Hand Dominance: Right          QuickDASH Assessment      9/24/2024     8:13 AM   QuickDASH Main   1. Open a tight or new jar Mild difficulty   2. Do heavy household chores (e.g., wash walls, floors) Moderate difficulty   3. Carry a shopping bag or briefcase No difficulty   4. Wash your back No difficulty   5. Use a knife to cut food No difficulty   6. Recreational activities in which you take some force or impact through your arm, shoulder or hand (e.g., golf, hammering, tennis, etc.) Unable to answer   7. During the past week, to what extent has your arm, shoulder or hand problem interfered with your normal social activities with family, friends, neighbours or groups Not at all   8. During the past week, were you limited in your work or other regular daily activities as a result of your arm, shoulder or hand problem Slightly limited   9. Arm, shoulder or hand pain Mild   10.Tingling (pins and needles) in your arm,shoulder or hand None   11. During the past week, how much difficulty have you had sleeping because of the pain in your arm, shoulder or hand No difficulty   Quickdash Ability Score 9.09          Current Outpatient Medications   Medication Sig Dispense Refill    acetaminophen (TYLENOL) 325 MG tablet Take 2 tablets (650 mg) by mouth every 4 hours as needed for mild pain or fever 12 tablet 0    escitalopram (LEXAPRO) 10 MG tablet Take 5-20 mg by mouth daily Take 1/2 tablet daily x5 days, 1 tablet daily x5 days, 1.5 tablets daily x5 days then 2  tablets daily - RX written 12/27/22 (Patient not taking: Reported on 5/13/2024)      ESTRADIOL VALERATE IM Inject 4 mg into the muscle once a week On Thursdays      oxyCODONE (ROXICODONE) 5 MG tablet Take 1 tablet (5 mg) by mouth every 4 hours as needed for moderate pain (4-6) (Patient not taking: Reported on 5/13/2024) 12 tablet 0    progesterone (PROMETRIUM) 200 MG capsule Take 200 mg by mouth daily      sertraline (ZOLOFT) 100 MG tablet Take 100 mg by mouth daily      spironolactone (ALDACTONE) 100 MG tablet Take 125 mg by mouth daily         No Known Allergies    JUDIT ANGEL, ATC

## 2024-09-24 NOTE — LETTER
9/24/2024      Manish Jones  [address]  [place] [address]      Dear Colleague,    Thank you for referring your patient, Manish Jones, to the [facility] [place]. Please see a copy of my visit note below.    Ortho Hand    HPI: 19 year-old RHD NS p/w L thumb tip injury. She was chopping cilantro and this resulted in a laceration to the tip of the L thumb with loss of pulp. No other injuries. This occurred 9 days ago. She went to ER for washout. She has been using ointment and has been washing with warm water and soap.     ROS: Negative, see HPI  PMH: Nondiabetic  PSH: No surgeries to the hands, wrists and/or elbows  Medications: No blood thinners  Allergies: None  SH: Nonsmoker, denies any tobacco or nicotine use  FH: No bleeding or clotting issues, or problems with anesthesia     Examination:  Nonlabored breathing  Not distressed  L thumb with healing obliquely oriented pulp wound with no signs of infection and no exposed bone    A/P: 19 F RHD NS p/w L thumb tip injury with pulp wound    -Instructed patient to apply petroleum based ointment with daily band-aid changes. Explained that the nail plate regrowth takes 3-4 months and there may be a deformity, but we will have to monitor.  If there is any changes in the status of the wound, patient should return to clinic.  My expectation however is that this will heal just fine.  All questions answered.  -A total of 45 minutes was devoted to review of chart, direct face-to-face patient counseling and documentation during this encounter, exclusive of any procedure performed.    Osman Hirsch MD, PhD'      Again, thank you for allowing me to participate in the care of your patient.        Sincerely,        Osman Hirsch MD

## 2024-09-25 NOTE — PROGRESS NOTES
Ortho Hand    HPI: 19 year-old RHD NS p/w L thumb tip injury. She was chopping cilantro and this resulted in a laceration to the tip of the L thumb with loss of pulp. No other injuries. This occurred 9 days ago. She went to ER for washout. She has been using ointment and has been washing with warm water and soap.     ROS: Negative, see HPI  PMH: Nondiabetic  PSH: No surgeries to the hands, wrists and/or elbows  Medications: No blood thinners  Allergies: None  SH: Nonsmoker, denies any tobacco or nicotine use  FH: No bleeding or clotting issues, or problems with anesthesia     Examination:  Nonlabored breathing  Not distressed  L thumb with healing obliquely oriented pulp wound with no signs of infection and no exposed bone    A/P: 19 F RHD NS p/w L thumb tip injury with pulp wound    -Instructed patient to apply petroleum based ointment with daily band-aid changes. Explained that the nail plate regrowth takes 3-4 months and there may be a deformity, but we will have to monitor.  If there is any changes in the status of the wound, patient should return to clinic.  My expectation however is that this will heal just fine.  All questions answered.  -A total of 45 minutes was devoted to review of chart, direct face-to-face patient counseling and documentation during this encounter, exclusive of any procedure performed.    Osman Hirsch MD, PhD'

## 2024-11-03 DIAGNOSIS — F64.9 GENDER DYSPHORIA: Primary | ICD-10-CM

## 2024-11-03 RX ORDER — CEFAZOLIN SODIUM 2 G/50ML
2 SOLUTION INTRAVENOUS
OUTPATIENT
Start: 2024-11-03

## 2024-11-03 RX ORDER — ACETAMINOPHEN 650 MG/1
650 SUPPOSITORY RECTAL ONCE
OUTPATIENT
Start: 2024-11-03

## 2024-11-03 RX ORDER — ACETAMINOPHEN 325 MG/1
975 TABLET ORAL ONCE
OUTPATIENT
Start: 2024-11-03 | End: 2024-11-03

## 2024-11-03 RX ORDER — CEFAZOLIN SODIUM 2 G/50ML
2 SOLUTION INTRAVENOUS SEE ADMIN INSTRUCTIONS
OUTPATIENT
Start: 2024-11-03

## 2024-11-04 PROBLEM — F64.9 GENDER DYSPHORIA: Status: ACTIVE | Noted: 2024-11-03

## 2024-12-26 ENCOUNTER — TELEPHONE (OUTPATIENT)
Dept: PLASTIC SURGERY | Facility: CLINIC | Age: 19
End: 2024-12-26
Payer: COMMERCIAL

## 2025-01-06 ENCOUNTER — TELEPHONE (OUTPATIENT)
Dept: UROLOGY | Facility: CLINIC | Age: 20
End: 2025-01-06
Payer: COMMERCIAL

## 2025-01-06 NOTE — TELEPHONE ENCOUNTER
Left message to schedule surgery with Dr. Delarosa.  Writer left call back number [phone] on the patients voicemail.     Jessica Butt on 1/6/2025 at 3:45 PM

## 2025-02-04 ENCOUNTER — TELEPHONE (OUTPATIENT)
Dept: PLASTIC SURGERY | Facility: CLINIC | Age: 20
End: 2025-02-04
Payer: COMMERCIAL

## 2025-03-25 ENCOUNTER — TELEPHONE (OUTPATIENT)
Dept: PLASTIC SURGERY | Facility: CLINIC | Age: 20
End: 2025-03-25
Payer: COMMERCIAL

## 2025-03-25 NOTE — TELEPHONE ENCOUNTER
Called to schedule pre-op phone call for prior to 4/25 orchi. Scheduled with patient for 4/7 at 2 pm

## 2025-04-07 ENCOUNTER — VIRTUAL VISIT (OUTPATIENT)
Dept: PLASTIC SURGERY | Facility: CLINIC | Age: 20
End: 2025-04-07
Payer: COMMERCIAL

## 2025-04-07 DIAGNOSIS — F64.0 GENDER DYSPHORIA IN ADULT: Primary | ICD-10-CM

## 2025-04-07 PROCEDURE — 99207 PR NO CHARGE NURSE ONLY: CPT | Mod: 93

## 2025-04-07 NOTE — PROGRESS NOTES
Pre and Post Op Patient Education                                       Diagnosis: gender dysphoria  Teaching pre and post op for: Sho Robert  Person involved in teaching: patient    Patient demonstrates an understanding of the following:  - Date of surgery:  4/25/25  - Surgery time: 11 am (pt understands that this time could change)  - Location of surgery: [facility], [address] [place] [id number] (5th floor)  - Pre-operative history physical: No  --  will schedule schedule at [place] pt fax number  - Post-op follow-up: 5/15/25    Nicotine use: occasional nicotine, discussed refraining before and after surgery    Patient verbalizes an understanding of the following:  - The need for a responsible adult  and someone to stay with them for the first 24 hours post-operatively: YES  - NPO per anesthesia guidelines: YES  - Pre-op showering x2 with Hibiclens/chlorhexidine soap: YES - Soh did not get soap mailed to her. RN told instructions on how to get at any [facility] pharmacy on campus.   - Holding ASA, NSAIDs, Multivitamins/supplements 1 week before surgery and whatever medications their doctors tell them to hold: Yes  - Removing piercings Yes, nostril piercing. Discussed options.    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 Discussed taking 2 weeks off and checking to see how she is feeling after that. Discussed that some patients with  physical jobs might need to take up to 4 weeks off after surgery.     Surgical instructions given to patient via phone.    Total time with patient: 20 minutes

## 2025-06-10 ENCOUNTER — TELEPHONE (OUTPATIENT)
Dept: PLASTIC SURGERY | Facility: CLINIC | Age: 20
End: 2025-06-10
Payer: COMMERCIAL

## 2025-06-10 NOTE — TELEPHONE ENCOUNTER
Left VM with callback number regarding upcoming surgery and if she needs surgical soap resent. Also reminded her to schedule her pre-op with PCP within 30 days of surgery.

## 2025-06-23 ENCOUNTER — TELEPHONE (OUTPATIENT)
Dept: PLASTIC SURGERY | Facility: CLINIC | Age: 20
End: 2025-06-23
Payer: COMMERCIAL

## 2025-06-24 ENCOUNTER — TELEPHONE (OUTPATIENT)
Dept: UROLOGY | Facility: CLINIC | Age: 20
End: 2025-06-24
Payer: COMMERCIAL

## 2025-06-24 NOTE — TELEPHONE ENCOUNTER
----- Message from Candy MCCANN sent at 6/23/2025  5:33 PM CDT -----  Regarding: wants to reschedule orchiectomy  Hello,    Can you reach out to this patient to offer a different date/ time for surgery? She left me a VM to reschedule her orchiectomy (scheduled 7/17)  Thanks!

## 2025-06-24 NOTE — TELEPHONE ENCOUNTER
Returned patient call to reschedule surgery with Dr Delarosa    Spoke with: Sho (patient)     Date of Surgery: 8/22/25    Estimated Arrival time Discussed with Patient:  Yes    Location of surgery: [facility]     Pre-Op H&P: [facility]    Labs: Not Applicable     Imaging: Not Applicable     Post-Op Appt Date: SHE Curran CNP   9/12/25 at 0900    Post-Op Imaging Date:  Not Applicable     Discussed with patient pre-op RN will call 2-3 days prior to surgery with arrival time and instructions:  Yes     Standard Surgery Packet Sent: Yes 06/24/25  via Mail - Standard      Additional Comments: Patient will like to talk about having a full depth and if the orchiectomy is required prior or if they can just have the full depth.       All patients questions were answered and was instructed to review surgical packet and call back with any questions or concerns.       Geraldine Gonzalez on 6/24/2025 at 8:39 AM

## 2025-08-22 ENCOUNTER — HOSPITAL ENCOUNTER (OUTPATIENT)
Facility: AMBULATORY SURGERY CENTER | Age: 20
Discharge: HOME | End: 2025-08-22
Attending: UROLOGY
Payer: COMMERCIAL

## 2025-08-22 VITALS
DIASTOLIC BLOOD PRESSURE: 52 MMHG | SYSTOLIC BLOOD PRESSURE: 109 MMHG | OXYGEN SATURATION: 99 % | TEMPERATURE: 98.4 F | BODY MASS INDEX: 29.4 KG/M2 | HEART RATE: 68 BPM | RESPIRATION RATE: 12 BRPM | HEIGHT: 71 IN | WEIGHT: 210 LBS

## 2025-08-22 DIAGNOSIS — F64.9 GENDER DYSPHORIA: Primary | ICD-10-CM

## 2025-08-22 RX ORDER — CEFAZOLIN SODIUM 2 G/50ML
2 SOLUTION INTRAVENOUS
Status: COMPLETED | OUTPATIENT
Start: 2025-08-22 | End: 2025-08-22

## 2025-08-22 RX ORDER — DEXAMETHASONE SODIUM PHOSPHATE 10 MG/ML
4 INJECTION, SOLUTION INTRAMUSCULAR; INTRAVENOUS
Status: DISCONTINUED | OUTPATIENT
Start: 2025-08-22 | End: 2025-08-23 | Stop reason: HOSPADM

## 2025-08-22 RX ORDER — ONDANSETRON 4 MG/1
4 TABLET, ORALLY DISINTEGRATING ORAL EVERY 30 MIN PRN
Status: DISCONTINUED | OUTPATIENT
Start: 2025-08-22 | End: 2025-08-23 | Stop reason: HOSPADM

## 2025-08-22 RX ORDER — SODIUM CHLORIDE, SODIUM LACTATE, POTASSIUM CHLORIDE, CALCIUM CHLORIDE 600; 310; 30; 20 MG/100ML; MG/100ML; MG/100ML; MG/100ML
INJECTION, SOLUTION INTRAVENOUS CONTINUOUS
Status: DISCONTINUED | OUTPATIENT
Start: 2025-08-22 | End: 2025-08-22 | Stop reason: HOSPADM

## 2025-08-22 RX ORDER — NALOXONE HYDROCHLORIDE 0.4 MG/ML
0.1 INJECTION, SOLUTION INTRAMUSCULAR; INTRAVENOUS; SUBCUTANEOUS
Status: DISCONTINUED | OUTPATIENT
Start: 2025-08-22 | End: 2025-08-23 | Stop reason: HOSPADM

## 2025-08-22 RX ORDER — HYDROMORPHONE HYDROCHLORIDE 1 MG/ML
0.2 INJECTION, SOLUTION INTRAMUSCULAR; INTRAVENOUS; SUBCUTANEOUS EVERY 5 MIN PRN
Status: DISCONTINUED | OUTPATIENT
Start: 2025-08-22 | End: 2025-08-23 | Stop reason: HOSPADM

## 2025-08-22 RX ORDER — OXYCODONE HYDROCHLORIDE 5 MG/1
10 TABLET ORAL
Status: DISCONTINUED | OUTPATIENT
Start: 2025-08-22 | End: 2025-08-23 | Stop reason: HOSPADM

## 2025-08-22 RX ORDER — FENTANYL CITRATE 50 UG/ML
25 INJECTION, SOLUTION INTRAMUSCULAR; INTRAVENOUS
Status: DISCONTINUED | OUTPATIENT
Start: 2025-08-22 | End: 2025-08-23 | Stop reason: HOSPADM

## 2025-08-22 RX ORDER — ACETAMINOPHEN 650 MG/1
650 SUPPOSITORY RECTAL ONCE
Status: COMPLETED | OUTPATIENT
Start: 2025-08-22 | End: 2025-08-22

## 2025-08-22 RX ORDER — CEFAZOLIN SODIUM 2 G/50ML
2 SOLUTION INTRAVENOUS SEE ADMIN INSTRUCTIONS
Status: DISCONTINUED | OUTPATIENT
Start: 2025-08-22 | End: 2025-08-22 | Stop reason: HOSPADM

## 2025-08-22 RX ORDER — SODIUM CHLORIDE, SODIUM LACTATE, POTASSIUM CHLORIDE, CALCIUM CHLORIDE 600; 310; 30; 20 MG/100ML; MG/100ML; MG/100ML; MG/100ML
INJECTION, SOLUTION INTRAVENOUS CONTINUOUS
Status: DISCONTINUED | OUTPATIENT
Start: 2025-08-22 | End: 2025-08-23 | Stop reason: HOSPADM

## 2025-08-22 RX ORDER — ACETAMINOPHEN 325 MG/1
650 TABLET ORAL EVERY 4 HOURS PRN
Qty: 50 TABLET | Refills: 0 | Status: SHIPPED | OUTPATIENT
Start: 2025-08-22

## 2025-08-22 RX ORDER — LABETALOL HYDROCHLORIDE 5 MG/ML
10 INJECTION, SOLUTION INTRAVENOUS
Status: DISCONTINUED | OUTPATIENT
Start: 2025-08-22 | End: 2025-08-23 | Stop reason: HOSPADM

## 2025-08-22 RX ORDER — ACETAMINOPHEN 325 MG/1
975 TABLET ORAL ONCE
Status: COMPLETED | OUTPATIENT
Start: 2025-08-22 | End: 2025-08-22

## 2025-08-22 RX ORDER — OXYCODONE HYDROCHLORIDE 5 MG/1
5-10 TABLET ORAL EVERY 4 HOURS PRN
Qty: 6 TABLET | Refills: 0 | Status: SHIPPED | OUTPATIENT
Start: 2025-08-22

## 2025-08-22 RX ORDER — HYDROMORPHONE HYDROCHLORIDE 1 MG/ML
0.4 INJECTION, SOLUTION INTRAMUSCULAR; INTRAVENOUS; SUBCUTANEOUS EVERY 5 MIN PRN
Status: DISCONTINUED | OUTPATIENT
Start: 2025-08-22 | End: 2025-08-23 | Stop reason: HOSPADM

## 2025-08-22 RX ORDER — ACETAMINOPHEN 325 MG/1
975 TABLET ORAL ONCE
Status: DISCONTINUED | OUTPATIENT
Start: 2025-08-22 | End: 2025-08-22 | Stop reason: HOSPADM

## 2025-08-22 RX ORDER — BUPIVACAINE HCL/EPINEPHRINE 0.5-1:200K
VIAL (ML) INJECTION PRN
Status: DISCONTINUED | OUTPATIENT
Start: 2025-08-22 | End: 2025-08-22 | Stop reason: HOSPADM

## 2025-08-22 RX ORDER — MEPERIDINE HYDROCHLORIDE 25 MG/ML
12.5 INJECTION INTRAMUSCULAR; INTRAVENOUS; SUBCUTANEOUS EVERY 5 MIN PRN
Status: DISCONTINUED | OUTPATIENT
Start: 2025-08-22 | End: 2025-08-23 | Stop reason: HOSPADM

## 2025-08-22 RX ORDER — FENTANYL CITRATE 50 UG/ML
50 INJECTION, SOLUTION INTRAMUSCULAR; INTRAVENOUS EVERY 5 MIN PRN
Status: DISCONTINUED | OUTPATIENT
Start: 2025-08-22 | End: 2025-08-23 | Stop reason: HOSPADM

## 2025-08-22 RX ORDER — ONDANSETRON 2 MG/ML
4 INJECTION INTRAMUSCULAR; INTRAVENOUS EVERY 30 MIN PRN
Status: DISCONTINUED | OUTPATIENT
Start: 2025-08-22 | End: 2025-08-23 | Stop reason: HOSPADM

## 2025-08-22 RX ORDER — ONDANSETRON 4 MG/1
4 TABLET, ORALLY DISINTEGRATING ORAL EVERY 8 HOURS PRN
Qty: 4 TABLET | Refills: 0 | Status: SHIPPED | OUTPATIENT
Start: 2025-08-22

## 2025-08-22 RX ORDER — LIDOCAINE 40 MG/G
CREAM TOPICAL
Status: DISCONTINUED | OUTPATIENT
Start: 2025-08-22 | End: 2025-08-22 | Stop reason: HOSPADM

## 2025-08-22 RX ORDER — FENTANYL CITRATE 50 UG/ML
25 INJECTION, SOLUTION INTRAMUSCULAR; INTRAVENOUS EVERY 5 MIN PRN
Status: DISCONTINUED | OUTPATIENT
Start: 2025-08-22 | End: 2025-08-23 | Stop reason: HOSPADM

## 2025-08-22 RX ORDER — AMOXICILLIN 250 MG
1-2 CAPSULE ORAL 2 TIMES DAILY
Qty: 30 TABLET | Refills: 0 | Status: SHIPPED | OUTPATIENT
Start: 2025-08-22

## 2025-08-22 RX ORDER — OXYCODONE HYDROCHLORIDE 5 MG/1
5 TABLET ORAL
Status: COMPLETED | OUTPATIENT
Start: 2025-08-22 | End: 2025-08-22

## 2025-08-22 RX ADMIN — ACETAMINOPHEN 975 MG: 325 TABLET ORAL at 09:27

## 2025-08-22 RX ADMIN — SODIUM CHLORIDE, SODIUM LACTATE, POTASSIUM CHLORIDE, CALCIUM CHLORIDE: 600; 310; 30; 20 INJECTION, SOLUTION INTRAVENOUS at 09:27

## 2025-08-22 RX ADMIN — OXYCODONE HYDROCHLORIDE 5 MG: 5 TABLET ORAL at 12:50

## 2025-08-27 ENCOUNTER — HOSPITAL ENCOUNTER (EMERGENCY)
Facility: CLINIC | Age: 20
Discharge: HOME | End: 2025-08-28
Attending: STUDENT IN AN ORGANIZED HEALTH CARE EDUCATION/TRAINING PROGRAM | Admitting: STUDENT IN AN ORGANIZED HEALTH CARE EDUCATION/TRAINING PROGRAM
Payer: COMMERCIAL

## 2025-08-27 ENCOUNTER — HOSPITAL ENCOUNTER (EMERGENCY)
Facility: CLINIC | Age: 20
Discharge: HOME | End: 2025-08-27
Attending: STUDENT IN AN ORGANIZED HEALTH CARE EDUCATION/TRAINING PROGRAM | Admitting: STUDENT IN AN ORGANIZED HEALTH CARE EDUCATION/TRAINING PROGRAM
Payer: COMMERCIAL

## 2025-08-27 VITALS
DIASTOLIC BLOOD PRESSURE: 119 MMHG | RESPIRATION RATE: 20 BRPM | OXYGEN SATURATION: 100 % | SYSTOLIC BLOOD PRESSURE: 140 MMHG | TEMPERATURE: 98.4 F | HEART RATE: 88 BPM

## 2025-08-27 DIAGNOSIS — L50.9 URTICARIA: Primary | ICD-10-CM

## 2025-08-27 DIAGNOSIS — T78.40XA ALLERGIC REACTION, INITIAL ENCOUNTER: Primary | ICD-10-CM

## 2025-08-27 PROCEDURE — 99283 EMERGENCY DEPT VISIT LOW MDM: CPT | Performed by: STUDENT IN AN ORGANIZED HEALTH CARE EDUCATION/TRAINING PROGRAM

## 2025-08-27 PROCEDURE — 250N000013 HC RX MED GY IP 250 OP 250 PS 637: Performed by: STUDENT IN AN ORGANIZED HEALTH CARE EDUCATION/TRAINING PROGRAM

## 2025-08-27 PROCEDURE — 96374 THER/PROPH/DIAG INJ IV PUSH: CPT | Performed by: STUDENT IN AN ORGANIZED HEALTH CARE EDUCATION/TRAINING PROGRAM

## 2025-08-27 PROCEDURE — 99284 EMERGENCY DEPT VISIT MOD MDM: CPT | Mod: 25 | Performed by: STUDENT IN AN ORGANIZED HEALTH CARE EDUCATION/TRAINING PROGRAM

## 2025-08-27 PROCEDURE — 99284 EMERGENCY DEPT VISIT MOD MDM: CPT | Performed by: STUDENT IN AN ORGANIZED HEALTH CARE EDUCATION/TRAINING PROGRAM

## 2025-08-27 RX ORDER — EPINEPHRINE 0.3 MG/.3ML
0.3 INJECTION SUBCUTANEOUS
Qty: 2 EACH | Refills: 0 | Status: SHIPPED | OUTPATIENT
Start: 2025-08-27

## 2025-08-27 RX ORDER — LORATADINE 10 MG/1
10 TABLET ORAL DAILY
Qty: 30 TABLET | Refills: 0 | Status: SHIPPED | OUTPATIENT
Start: 2025-08-27

## 2025-08-27 RX ORDER — DIPHENHYDRAMINE HCL 25 MG
25 CAPSULE ORAL ONCE
Status: COMPLETED | OUTPATIENT
Start: 2025-08-27 | End: 2025-08-27

## 2025-08-27 RX ORDER — DIPHENHYDRAMINE HCL 50 MG
50 CAPSULE ORAL ONCE
Status: COMPLETED | OUTPATIENT
Start: 2025-08-27 | End: 2025-08-28

## 2025-08-27 RX ADMIN — DIPHENHYDRAMINE HYDROCHLORIDE 25 MG: 25 CAPSULE ORAL at 16:40

## 2025-08-27 ASSESSMENT — ACTIVITIES OF DAILY LIVING (ADL)
ADLS_ACUITY_SCORE: 46

## 2025-08-27 ASSESSMENT — COLUMBIA-SUICIDE SEVERITY RATING SCALE - C-SSRS
2. HAVE YOU ACTUALLY HAD ANY THOUGHTS OF KILLING YOURSELF IN THE PAST MONTH?: NO
6. HAVE YOU EVER DONE ANYTHING, STARTED TO DO ANYTHING, OR PREPARED TO DO ANYTHING TO END YOUR LIFE?: NO
1. IN THE PAST MONTH, HAVE YOU WISHED YOU WERE DEAD OR WISHED YOU COULD GO TO SLEEP AND NOT WAKE UP?: NO

## 2025-08-28 VITALS
TEMPERATURE: 97.8 F | RESPIRATION RATE: 20 BRPM | OXYGEN SATURATION: 98 % | SYSTOLIC BLOOD PRESSURE: 150 MMHG | HEART RATE: 92 BPM | DIASTOLIC BLOOD PRESSURE: 80 MMHG

## 2025-08-28 PROCEDURE — 250N000011 HC RX IP 250 OP 636: Performed by: STUDENT IN AN ORGANIZED HEALTH CARE EDUCATION/TRAINING PROGRAM

## 2025-08-28 PROCEDURE — 250N000013 HC RX MED GY IP 250 OP 250 PS 637: Performed by: STUDENT IN AN ORGANIZED HEALTH CARE EDUCATION/TRAINING PROGRAM

## 2025-08-28 RX ORDER — DIPHENHYDRAMINE HCL 25 MG
25 TABLET ORAL EVERY 6 HOURS PRN
Qty: 60 TABLET | Refills: 0 | Status: SHIPPED | OUTPATIENT
Start: 2025-08-28 | End: 2025-09-27

## 2025-08-28 RX ADMIN — DIPHENHYDRAMINE HYDROCHLORIDE 50 MG: 50 CAPSULE ORAL at 00:18

## 2025-08-28 RX ADMIN — FAMOTIDINE 20 MG: 10 INJECTION, SOLUTION INTRAVENOUS at 00:18

## 2025-08-28 ASSESSMENT — ACTIVITIES OF DAILY LIVING (ADL): ADLS_ACUITY_SCORE: 46

## (undated) DEVICE — TIP CAUTERY L HOOK 36CM E377336C

## (undated) DEVICE — SU VICRYL 0 UR-6 27" J603H

## (undated) DEVICE — ANTIFOG SOLUTION W/FOAM PAD 31142527

## (undated) DEVICE — ENDO TROCAR FIRST ENTRY KII FIOS Z-THRD 05X100MM CTF03

## (undated) DEVICE — ENDO POUCH UNIVERSAL RETRIEVAL SYSTEM INZII 5MM CD003

## (undated) DEVICE — PACK ENDOSCOPY GI CUSTOM UMMC

## (undated) DEVICE — GLOVE PROTEXIS BLUE W/NEU-THERA 7.5  2D73EB75

## (undated) DEVICE — ENDO DISSECTOR BLUNT 05MM  BTD05

## (undated) DEVICE — SOL WATER IRRIG 1000ML BOTTLE 2F7114

## (undated) DEVICE — SU SILK 0 SH 30" K834H

## (undated) DEVICE — ENDO TUBING CO2 SMARTCAP STERILE DISP 100145CO2EXT

## (undated) DEVICE — TUBING SUCTION 10'X3/16" N510

## (undated) DEVICE — PAD CHUX UNDERPAD 30X36" P3036C

## (undated) DEVICE — LINEN TOWEL PACK X5 5464

## (undated) DEVICE — SUCTION MANIFOLD NEPTUNE 2 SYS 1 PORT 702-025-000

## (undated) DEVICE — SOL NACL 0.9% IRRIG 500ML BOTTLE 2F7123

## (undated) DEVICE — PREP CHLORAPREP 26ML TINTED HI-LITE ORANGE 930815

## (undated) DEVICE — ESU PENCIL W/COATED BLADE E2450H

## (undated) DEVICE — SOL NACL 0.9% INJ 1000ML BAG 2B1324X

## (undated) DEVICE — SU MONOCRYL 4-0 PS-2 27" UND Y426H

## (undated) DEVICE — BLADE CLIPPER DISP 4406

## (undated) DEVICE — ESU GROUND PAD ADULT W/CORD E7507

## (undated) DEVICE — LINEN TOWEL PACK X6 WHITE 5487

## (undated) DEVICE — GLOVE PROTEXIS POWDER FREE SMT 7.5  2D72PT75X

## (undated) DEVICE — STRAP UNIVERSAL POSITIONING 2-PIECE 4X47X76" 91-287

## (undated) DEVICE — KIT CONNECTOR FOR OLYMPUS ENDOSCOPES DEFENDO 100310

## (undated) DEVICE — NDL INSUFFLATION 13GA 120MM C2201

## (undated) DEVICE — RX BACITRACIN OINTMENT 14G 0.5OZ 45802-060-01

## (undated) DEVICE — SU VICRYL 0 TIE 54" J608H

## (undated) DEVICE — TUBING SMOKE EVAC PNEUMOCLEAR HIGH FLOW 0620050250

## (undated) DEVICE — BIOPSY VALVE BIOSHIELD 00711135

## (undated) DEVICE — ADH SKIN CLOSURE PREMIERPRO EXOFIN 1.0ML 3470

## (undated) DEVICE — LIGHT HANDLE X1 31140133

## (undated) DEVICE — BALLOON EXTRACTION 15X1950MM 3.2MM TL B-V243Q-A

## (undated) DEVICE — SUCTION MANIFOLD NEPTUNE 2 SYS 4 PORT 0702-020-000

## (undated) DEVICE — DEVICE SUTURE PASSER 14GA WECK EFX EFXSP2

## (undated) DEVICE — PANTIES MESH LG/XLG 2PK 706M2

## (undated) DEVICE — ENDO TROCAR SLEEVE KII Z-THREADED 05X100MM CTS02

## (undated) DEVICE — GUIDEWIRE NOVAGOLD .018X260CM STR TIP M00552000

## (undated) DEVICE — CLIP APPLIER ENDO 5MM M/L LIGAMAX EL5ML

## (undated) DEVICE — ESU ENDO SCISSORS 5MM CVD 5DCS

## (undated) DEVICE — ENDO POUCH UNIV RETRIEVAL SYSTEM INZII 10MM CD001

## (undated) DEVICE — KIT ENDO FIRST STEP DISINFECTANT 200ML W/POUCH EP-4

## (undated) DEVICE — SU VICRYL+ 3-0 27IN SH UND VCP416H

## (undated) DEVICE — ENDO TROCAR FIRST ENTRY KII FIOS ADV FIX 12X100MM CFF73

## (undated) DEVICE — PACK MINOR CUSTOM ASC

## (undated) DEVICE — DRSG KERLIX FLUFFS X5

## (undated) DEVICE — Device

## (undated) DEVICE — ENDO FUSION OMNI-TOME 21 FS-OMNI-21 G48675

## (undated) DEVICE — DRAPE LAP W/ARMBOARD 29410

## (undated) DEVICE — COVER CAMERA IN-LIGHT DISP LT-C02

## (undated) DEVICE — LINEN TOWEL PACK X30 5481

## (undated) DEVICE — ENDO BITE BLOCK ADULT OMNI-BLOC

## (undated) RX ORDER — LIDOCAINE HYDROCHLORIDE 10 MG/ML
INJECTION, SOLUTION EPIDURAL; INFILTRATION; INTRACAUDAL; PERINEURAL
Status: DISPENSED
Start: 2023-01-06

## (undated) RX ORDER — FENTANYL CITRATE 50 UG/ML
INJECTION, SOLUTION INTRAMUSCULAR; INTRAVENOUS
Status: DISPENSED
Start: 2023-01-06

## (undated) RX ORDER — FENTANYL CITRATE 50 UG/ML
INJECTION, SOLUTION INTRAMUSCULAR; INTRAVENOUS
Status: DISPENSED
Start: 2023-01-05

## (undated) RX ORDER — CEFAZOLIN SODIUM/WATER 2 G/20 ML
SYRINGE (ML) INTRAVENOUS
Status: DISPENSED
Start: 2023-01-05

## (undated) RX ORDER — BUPIVACAINE HYDROCHLORIDE AND EPINEPHRINE 5; 5 MG/ML; UG/ML
INJECTION, SOLUTION EPIDURAL; INTRACAUDAL; PERINEURAL
Status: DISPENSED
Start: 2025-08-22

## (undated) RX ORDER — OXYCODONE HYDROCHLORIDE 5 MG/1
TABLET ORAL
Status: DISPENSED
Start: 2023-01-06

## (undated) RX ORDER — FENTANYL CITRATE 50 UG/ML
INJECTION, SOLUTION INTRAMUSCULAR; INTRAVENOUS
Status: DISPENSED
Start: 2025-08-22

## (undated) RX ORDER — CEFAZOLIN SODIUM/WATER 2 G/20 ML
SYRINGE (ML) INTRAVENOUS
Status: DISPENSED
Start: 2023-01-06

## (undated) RX ORDER — HYDROMORPHONE HYDROCHLORIDE 1 MG/ML
INJECTION, SOLUTION INTRAMUSCULAR; INTRAVENOUS; SUBCUTANEOUS
Status: DISPENSED
Start: 2023-01-05

## (undated) RX ORDER — HEPARIN SODIUM 5000 [USP'U]/.5ML
INJECTION, SOLUTION INTRAVENOUS; SUBCUTANEOUS
Status: DISPENSED
Start: 2023-01-05

## (undated) RX ORDER — DEXAMETHASONE SODIUM PHOSPHATE 4 MG/ML
INJECTION, SOLUTION INTRA-ARTICULAR; INTRALESIONAL; INTRAMUSCULAR; INTRAVENOUS; SOFT TISSUE
Status: DISPENSED
Start: 2023-01-06

## (undated) RX ORDER — CEFAZOLIN SODIUM 2 G/50ML
SOLUTION INTRAVENOUS
Status: DISPENSED
Start: 2025-08-22

## (undated) RX ORDER — ONDANSETRON 2 MG/ML
INJECTION INTRAMUSCULAR; INTRAVENOUS
Status: DISPENSED
Start: 2023-01-05

## (undated) RX ORDER — ACETAMINOPHEN 325 MG/1
TABLET ORAL
Status: DISPENSED
Start: 2023-01-06

## (undated) RX ORDER — PROPOFOL 10 MG/ML
INJECTION, EMULSION INTRAVENOUS
Status: DISPENSED
Start: 2023-01-06

## (undated) RX ORDER — ACETAMINOPHEN 325 MG/1
TABLET ORAL
Status: DISPENSED
Start: 2025-08-22

## (undated) RX ORDER — OXYCODONE HYDROCHLORIDE 5 MG/1
TABLET ORAL
Status: DISPENSED
Start: 2025-08-22

## (undated) RX ORDER — ONDANSETRON 2 MG/ML
INJECTION INTRAMUSCULAR; INTRAVENOUS
Status: DISPENSED
Start: 2023-01-06

## (undated) RX ORDER — DEXAMETHASONE SODIUM PHOSPHATE 4 MG/ML
INJECTION, SOLUTION INTRA-ARTICULAR; INTRALESIONAL; INTRAMUSCULAR; INTRAVENOUS; SOFT TISSUE
Status: DISPENSED
Start: 2023-01-05

## (undated) RX ORDER — PROPOFOL 10 MG/ML
INJECTION, EMULSION INTRAVENOUS
Status: DISPENSED
Start: 2023-01-05

## (undated) RX ORDER — INDOMETHACIN 50 MG/1
SUPPOSITORY RECTAL
Status: DISPENSED
Start: 2023-01-05

## (undated) RX ORDER — BUPIVACAINE HYDROCHLORIDE AND EPINEPHRINE 2.5; 5 MG/ML; UG/ML
INJECTION, SOLUTION EPIDURAL; INFILTRATION; INTRACAUDAL; PERINEURAL
Status: DISPENSED
Start: 2023-01-06

## (undated) RX ORDER — KETOROLAC TROMETHAMINE 30 MG/ML
INJECTION, SOLUTION INTRAMUSCULAR; INTRAVENOUS
Status: DISPENSED
Start: 2025-08-22

## (undated) RX ORDER — ACETAMINOPHEN 325 MG/1
TABLET ORAL
Status: DISPENSED
Start: 2023-01-05